# Patient Record
Sex: MALE | Race: WHITE | NOT HISPANIC OR LATINO | Employment: OTHER | URBAN - METROPOLITAN AREA
[De-identification: names, ages, dates, MRNs, and addresses within clinical notes are randomized per-mention and may not be internally consistent; named-entity substitution may affect disease eponyms.]

---

## 2020-03-10 ENCOUNTER — OFFICE VISIT (OUTPATIENT)
Dept: PRIMARY CARE CLINIC | Facility: CLINIC | Age: 65
End: 2020-03-10
Payer: COMMERCIAL

## 2020-03-10 ENCOUNTER — CLINICAL SUPPORT (OUTPATIENT)
Dept: PRIMARY CARE CLINIC | Facility: CLINIC | Age: 65
End: 2020-03-10
Payer: COMMERCIAL

## 2020-03-10 VITALS
HEIGHT: 65 IN | OXYGEN SATURATION: 99 % | DIASTOLIC BLOOD PRESSURE: 82 MMHG | BODY MASS INDEX: 26.72 KG/M2 | SYSTOLIC BLOOD PRESSURE: 124 MMHG | WEIGHT: 160.38 LBS | HEART RATE: 92 BPM | TEMPERATURE: 98 F | RESPIRATION RATE: 17 BRPM

## 2020-03-10 DIAGNOSIS — E78.49 OTHER HYPERLIPIDEMIA: ICD-10-CM

## 2020-03-10 DIAGNOSIS — Z11.59 NEED FOR HEPATITIS C SCREENING TEST: ICD-10-CM

## 2020-03-10 DIAGNOSIS — Z72.0 TOBACCO ABUSE: ICD-10-CM

## 2020-03-10 DIAGNOSIS — Z11.59 NEED FOR HEPATITIS C SCREENING TEST: Primary | ICD-10-CM

## 2020-03-10 DIAGNOSIS — Z87.442 HISTORY OF NEPHROLITHIASIS: ICD-10-CM

## 2020-03-10 LAB
ALBUMIN SERPL BCP-MCNC: 4.7 G/DL (ref 3.5–5.2)
ALP SERPL-CCNC: 96 U/L (ref 38–126)
ALT SERPL W/O P-5'-P-CCNC: 38 U/L (ref 17–63)
ANION GAP SERPL CALC-SCNC: 11 MMOL/L (ref 8–16)
AST SERPL-CCNC: 27 U/L (ref 15–41)
BASOPHILS # BLD AUTO: 0 K/UL (ref 0–0.2)
BASOPHILS NFR BLD: 0.5 % (ref 0–1.9)
BILIRUB SERPL-MCNC: 0.7 MG/DL (ref 0.3–1.2)
BILIRUB SERPL-MCNC: NORMAL MG/DL
BLOOD URINE, POC: NORMAL
BUN SERPL-MCNC: 16 MG/DL (ref 8–23)
CALCIUM SERPL-MCNC: 10.1 MG/DL (ref 8.6–10)
CHLORIDE SERPL-SCNC: 101 MMOL/L (ref 101–111)
CHOLEST SERPL-MCNC: 278 MG/DL (ref 80–200)
CHOLEST/HDLC SERPL: 5.5 {RATIO} (ref 2–5)
CO2 SERPL-SCNC: 28 MMOL/L (ref 23–29)
COLOR, POC UA: YELLOW
CREAT SERPL-MCNC: 1.1 MG/DL (ref 0.5–1.4)
DIFFERENTIAL METHOD: ABNORMAL
EOSINOPHIL # BLD AUTO: 0.1 K/UL (ref 0–0.5)
EOSINOPHIL NFR BLD: 0.8 % (ref 0–8)
ERYTHROCYTE [DISTWIDTH] IN BLOOD BY AUTOMATED COUNT: 14.7 % (ref 11.5–14.5)
EST. GFR  (AFRICAN AMERICAN): >60 ML/MIN/1.73 M^2
EST. GFR  (NON AFRICAN AMERICAN): >60 ML/MIN/1.73 M^2
GLUCOSE SERPL-MCNC: 107 MG/DL (ref 74–118)
GLUCOSE UR QL STRIP: NORMAL
HCT VFR BLD AUTO: 52.5 % (ref 40–54)
HDLC SERPL-MCNC: 51 MG/DL (ref 40–75)
HDLC SERPL: 18.3 % (ref 20–50)
HGB BLD-MCNC: 18 G/DL (ref 14–18)
KETONES UR QL STRIP: NORMAL
LDLC SERPL CALC-MCNC: 173 MG/DL
LEUKOCYTE ESTERASE URINE, POC: NORMAL
LYMPHOCYTES # BLD AUTO: 2.8 K/UL (ref 1–4.8)
LYMPHOCYTES NFR BLD: 36.1 % (ref 18–48)
MCH RBC QN AUTO: 31.3 PG (ref 27–31)
MCHC RBC AUTO-ENTMCNC: 34.3 G/DL (ref 32–36)
MCV RBC AUTO: 91 FL (ref 82–98)
MONOCYTES # BLD AUTO: 0.6 K/UL (ref 0.3–1)
MONOCYTES NFR BLD: 8.2 % (ref 4–15)
NEUTROPHILS # BLD AUTO: 4.1 K/UL (ref 1.8–7.7)
NEUTROPHILS NFR BLD: 54.4 % (ref 38–73)
NITRITE, POC UA: NORMAL
NONHDLC SERPL-MCNC: 227 MG/DL
PH, POC UA: 7
PLATELET # BLD AUTO: 274 K/UL (ref 150–350)
PMV BLD AUTO: 7.6 FL (ref 9.2–12.9)
POTASSIUM SERPL-SCNC: 4.2 MMOL/L (ref 3.5–5.1)
PROT SERPL-MCNC: 8.7 G/DL (ref 6–8.4)
PROTEIN, POC: NORMAL
RBC # BLD AUTO: 5.76 M/UL (ref 4.6–6.2)
SODIUM SERPL-SCNC: 140 MMOL/L (ref 136–145)
SPECIFIC GRAVITY, POC UA: 1
T4 FREE SERPL-MCNC: 0.8 NG/DL (ref 0.61–1.12)
TRIGL SERPL-MCNC: 268 MG/DL (ref 30–150)
TSH SERPL DL<=0.005 MIU/L-ACNC: 3.72 UIU/ML (ref 0.45–5.33)
UROBILINOGEN, POC UA: NORMAL
WBC # BLD AUTO: 7.6 K/UL (ref 3.9–12.7)

## 2020-03-10 PROCEDURE — 99999 PR PBB SHADOW E&M-NEW PATIENT-LVL III: CPT | Mod: PBBFAC,,, | Performed by: FAMILY MEDICINE

## 2020-03-10 PROCEDURE — 85025 COMPLETE CBC W/AUTO DIFF WBC: CPT

## 2020-03-10 PROCEDURE — 99396 PREV VISIT EST AGE 40-64: CPT | Mod: 25,S$GLB,, | Performed by: FAMILY MEDICINE

## 2020-03-10 PROCEDURE — 86803 HEPATITIS C AB TEST: CPT

## 2020-03-10 PROCEDURE — 80053 COMPREHEN METABOLIC PANEL: CPT

## 2020-03-10 PROCEDURE — 80061 LIPID PANEL: CPT

## 2020-03-10 PROCEDURE — 84443 ASSAY THYROID STIM HORMONE: CPT

## 2020-03-10 PROCEDURE — 99999 PR PBB SHADOW E&M-NEW PATIENT-LVL III: ICD-10-PCS | Mod: PBBFAC,,, | Performed by: FAMILY MEDICINE

## 2020-03-10 PROCEDURE — 81002 URINALYSIS NONAUTO W/O SCOPE: CPT | Mod: S$GLB,,, | Performed by: FAMILY MEDICINE

## 2020-03-10 PROCEDURE — 99396 PR PREVENTIVE VISIT,EST,40-64: ICD-10-PCS | Mod: 25,S$GLB,, | Performed by: FAMILY MEDICINE

## 2020-03-10 PROCEDURE — 36415 PR COLLECTION VENOUS BLOOD,VENIPUNCTURE: ICD-10-PCS | Mod: S$GLB,,, | Performed by: FAMILY MEDICINE

## 2020-03-10 PROCEDURE — 36415 COLL VENOUS BLD VENIPUNCTURE: CPT | Mod: S$GLB,,, | Performed by: FAMILY MEDICINE

## 2020-03-10 PROCEDURE — 81002 POCT URINE DIPSTICK WITHOUT MICROSCOPE: ICD-10-PCS | Mod: S$GLB,,, | Performed by: FAMILY MEDICINE

## 2020-03-10 PROCEDURE — 84439 ASSAY OF FREE THYROXINE: CPT

## 2020-03-10 RX ORDER — FENOFIBRATE 134 MG/1
134 CAPSULE ORAL
Qty: 90 CAPSULE | Refills: 1 | Status: SHIPPED | OUTPATIENT
Start: 2020-03-10 | End: 2020-03-13 | Stop reason: SDUPTHER

## 2020-03-10 NOTE — PROGRESS NOTES
Subjective:       Patient ID: Elijah Love is a 64 y.o. male.    Chief Complaint: Establish Care (review wellness )    HPI: 65 yo WM -- patient in for wellness visit had wellness screening at work cholesterol 225 better of 180 HDL 43 that is normal triglyceride 4-0 9 better of 150 glucose 94 normal HDL eating well, +BM ambulating    ROS:  Skin: no psoriasis, eczema, skin cancer  HEENT: No headache, ocular pain, blurred vision, diplopia, epistaxis, hoarseness change in voice, thyroid trouble  Lung: No pneumonia, asthma, Tb, wheezing, SOB, +smoking 1 pack per day  Heart: No chest pain, ankle edema, palpitations, MI, micheal murmur, hypertension, +hyperlipidemia--taking test for the past 10 years last 3 or 4 been elevated no stent bypass arrhythmia  Abdomen: No nausea, vomiting, diarrhea, constipation, ulcers, hepatitis, gallbladder disease, melena, hematochezia, hematemesis  : no UTI, renal disease, +  stones x2 in 1980s-no prostate problems  MS: no fractures, O/A, lupus, rheumatoid, gout  Neuro: No dizziness, LOC, seizures   No diabetes, no anemia, no anxiety, no depression  Single, single, 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 Need for hepatitis C screening test    2. Other hyperlipidemia    3. Tobacco abuse    4. History of nephrolithiasis        Plan:       Need for hepatitis C screening test  -     Hepatitis C Antibody; Future; Expected date:  03/10/2020    Other hyperlipidemia  -     CBC auto differential; Future; Expected date: 03/10/2020  -     Comprehensive metabolic panel; Future; Expected date: 03/10/2020  -     EKG 12-lead; Future  -     Fecal Immunochemical Test (iFOBT); Future; Expected date: 03/10/2020  -     Lipid panel; Future; Expected date: 03/10/2020  -     X-Ray Chest PA And Lateral; Future; Expected date: 03/10/2020  -     POCT urine dipstick without microscope  -     T4, free; Future; Expected date: 03/10/2020  -     TSH; Future; Expected date: 03/10/2020    Tobacco abuse    History of nephrolithiasis      hyperlipidemia--by history from test done--at work--triglyceride 409--cholesterol 225--will start on fenofibrate 134--1 p.o. Q.d.-if patient doing well will redo lab in 6 months and possibly treat the cholesterol with atorvastatin  Do routine labs now CBCs CMP lipids T4 TSH stool guaiac UA chest x-ray EKG is physical  Health maintenance hepatitis C lipid HIV tetanus shingles:  Fluid

## 2020-03-11 LAB — HCV AB SERPL QL IA: NEGATIVE

## 2020-03-13 NOTE — TELEPHONE ENCOUNTER
----- Message from Namita Navarro sent at 3/13/2020  2:58 PM CDT -----  PT was seen on Tuesday and dr ortiz said he was calling in a chol. Med to [facility] in [place] pt went and they don't have anything. Can you please check for him.thanks

## 2020-03-14 RX ORDER — FENOFIBRATE 134 MG/1
134 CAPSULE ORAL
Qty: 90 CAPSULE | Refills: 1 | Status: SHIPPED | OUTPATIENT
Start: 2020-03-14 | End: 2020-10-20

## 2020-03-23 ENCOUNTER — TELEPHONE (OUTPATIENT)
Dept: PRIMARY CARE CLINIC | Facility: CLINIC | Age: 65
End: 2020-03-23

## 2020-03-23 NOTE — TELEPHONE ENCOUNTER
----- Message from Johan Ryan sent at 3/23/2020  2:36 PM CDT -----  Contact: Pt [phone]  The patient said the pharmacy never received the request, even though, our records show they did. He is requesting a written script for fenofibrate micronized (LOFIBRA) 134 MG Cap.    Please call him when it's ready for .    Thank you

## 2020-03-26 ENCOUNTER — TELEPHONE (OUTPATIENT)
Dept: PRIMARY CARE CLINIC | Facility: CLINIC | Age: 65
End: 2020-03-26

## 2020-03-26 NOTE — TELEPHONE ENCOUNTER
----- Message from Josy Pete sent at 3/26/2020  1:29 PM CDT -----  Contact: Patient  Type: Needs Medical Advice    Who Called:  Elijah patient  Symptoms (please be specific):  N/A  How long has patient had these symptoms:  N/A  Pharmacy name and phone #:    [id number] [place] - [address] AT [facility] [address]  [address] [address]  Phone: [phone] Fax: [phone]  Best Call Back Number: [phone]  Additional Information: Calling because Rx fenofibrate micronized (LOFIBRA) 134 MG Cap needs prior authorization. Please advise. Thanks.

## 2020-03-28 RX ORDER — GEMFIBROZIL 600 MG/1
600 TABLET, FILM COATED ORAL
Qty: 180 TABLET | Refills: 1 | Status: SHIPPED | OUTPATIENT
Start: 2020-03-28 | End: 2020-10-20

## 2020-10-05 ENCOUNTER — PATIENT MESSAGE (OUTPATIENT)
Dept: ADMINISTRATIVE | Facility: HOSPITAL | Age: 65
End: 2020-10-05

## 2020-10-20 ENCOUNTER — OFFICE VISIT (OUTPATIENT)
Dept: PRIMARY CARE CLINIC | Facility: CLINIC | Age: 65
End: 2020-10-20
Payer: COMMERCIAL

## 2020-10-20 VITALS
BODY MASS INDEX: 27.61 KG/M2 | DIASTOLIC BLOOD PRESSURE: 82 MMHG | OXYGEN SATURATION: 98 % | RESPIRATION RATE: 18 BRPM | HEART RATE: 101 BPM | WEIGHT: 165.69 LBS | HEIGHT: 65 IN | TEMPERATURE: 98 F | SYSTOLIC BLOOD PRESSURE: 134 MMHG

## 2020-10-20 DIAGNOSIS — E78.49 OTHER HYPERLIPIDEMIA: Primary | ICD-10-CM

## 2020-10-20 PROCEDURE — 3008F BODY MASS INDEX DOCD: CPT | Mod: CPTII,S$GLB,, | Performed by: FAMILY MEDICINE

## 2020-10-20 PROCEDURE — 99213 OFFICE O/P EST LOW 20 MIN: CPT | Mod: 25,S$GLB,, | Performed by: FAMILY MEDICINE

## 2020-10-20 PROCEDURE — 90714 TD VACCINE GREATER THAN OR EQUAL TO 7YO PRESERVATIVE FREE IM: ICD-10-PCS | Mod: S$GLB,,, | Performed by: FAMILY MEDICINE

## 2020-10-20 PROCEDURE — 99999 PR PBB SHADOW E&M-EST. PATIENT-LVL III: CPT | Mod: PBBFAC,,, | Performed by: FAMILY MEDICINE

## 2020-10-20 PROCEDURE — 3008F PR BODY MASS INDEX (BMI) DOCUMENTED: ICD-10-PCS | Mod: CPTII,S$GLB,, | Performed by: FAMILY MEDICINE

## 2020-10-20 PROCEDURE — 90471 IMMUNIZATION ADMIN: CPT | Mod: S$GLB,,, | Performed by: FAMILY MEDICINE

## 2020-10-20 PROCEDURE — 90471 TD VACCINE GREATER THAN OR EQUAL TO 7YO PRESERVATIVE FREE IM: ICD-10-PCS | Mod: S$GLB,,, | Performed by: FAMILY MEDICINE

## 2020-10-20 PROCEDURE — 90714 TD VACC NO PRESV 7 YRS+ IM: CPT | Mod: S$GLB,,, | Performed by: FAMILY MEDICINE

## 2020-10-20 PROCEDURE — 99213 PR OFFICE/OUTPT VISIT, EST, LEVL III, 20-29 MIN: ICD-10-PCS | Mod: 25,S$GLB,, | Performed by: FAMILY MEDICINE

## 2020-10-20 PROCEDURE — 99999 PR PBB SHADOW E&M-EST. PATIENT-LVL III: ICD-10-PCS | Mod: PBBFAC,,, | Performed by: FAMILY MEDICINE

## 2020-10-20 RX ORDER — FENOFIBRATE 145 MG/1
145 TABLET, FILM COATED ORAL DAILY
Qty: 30 TABLET | Refills: 5 | Status: SHIPPED | OUTPATIENT
Start: 2020-10-20 | End: 2022-09-28

## 2020-10-20 RX ORDER — FENOFIBRATE 145 MG/1
145 TABLET, FILM COATED ORAL DAILY
Qty: 30 TABLET | Refills: 5 | Status: SHIPPED | OUTPATIENT
Start: 2020-10-20 | End: 2020-10-20

## 2020-10-20 RX ORDER — FENOFIBRATE 145 MG/1
145 TABLET, FILM COATED ORAL DAILY
Qty: 90 TABLET | Refills: 3 | Status: SHIPPED | OUTPATIENT
Start: 2020-10-20 | End: 2020-10-20

## 2020-10-20 NOTE — PROGRESS NOTES
Subjective:       Patient ID: Elijah Love is a 65 y.o. male.    Chief Complaint: Medication Problem    HPI: 65 yo WM -- history of hyperlipidemia--patient unable to swallow Lopid 600 mg---patient states wants to fine to smaller pill can take fin fibrate 134.  Patient is on low-fat diet--exercise---no weight loss     Patient agrees to get tetanus shot will get pneumococcal vaccine later--arm just stops hurting from the flu shot last    ROS:  Skin: no psoriasis, eczema, skin cancer  HEENT: No headache, ocular pain, blurred vision, diplopia, epistaxis, hoarseness change in voice, thyroid trouble  Lung: No pneumonia, asthma, Tb, wheezing, SOB, +smoking 1 pack per day  Heart: No chest pain, ankle edema, palpitations, MI, micheal murmur, hypertension, +hyperlipidemia--taking test for the past 10 years last 3 or 4 been elevated no stent bypass arrhythmia  Abdomen: No nausea, vomiting, diarrhea, constipation, ulcers, hepatitis, gallbladder disease, melena, hematochezia, hematemesis  : no UTI, renal disease, +  stones x2 in 1980s-no prostate problems  MS: no fractures, O/A, lupus, rheumatoid, gout  Neuro: No dizziness, LOC, seizures   No diabetes, no anemia, no anxiety, no depression  Single, single, 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 Other hyperlipidemia        Plan:       Other hyperlipidemia  -     CBC auto differential; Future; Expected date:  04/20/2021  -     Comprehensive Metabolic Panel; Future; Expected date: 04/20/2021  -     Lipid Panel; Future; Expected date: 04/20/2021    Other orders  -     (In Office Administered) Td Vaccine - Preservative Free  -     Discontinue: fenofibrate (TRICOR) 145 MG tablet; Take 1 tablet (145 mg total) by mouth once daily.  Dispense: 90 tablet; Refill: 3  -     Discontinue: fenofibrate (TRICOR) 145 MG tablet; Take 1 tablet (145 mg total) by mouth once daily.  Dispense: 30 tablet; Refill: 5  -     fenofibrate (TRICOR) 145 MG tablet; Take 1 tablet (145 mg total) by mouth once daily.  Dispense: 30 tablet; Refill: 5        Hyperlipidemia---patient was unable to swallow Lopid states to big fenofibrate--134 is not covered fenofibrate 145--[facility] $9 [facility]   Patient agrees to get tetanus shot now---will get pneumococcal vaccine and 1 week--did not want to get both because had a sore arm from his flu shot  Do lab q.6 months CBCs CMP lipid

## 2020-10-20 NOTE — PROGRESS NOTES
Verified pt ID using name and . NKDA. Administered TD in left deltoid per physician order using aseptic technique. Aspirated and no blood return noted. Pt tolerated well with no adverse reactions noted.

## 2021-02-01 ENCOUNTER — TELEPHONE (OUTPATIENT)
Dept: SURGERY | Facility: CLINIC | Age: 66
End: 2021-02-01

## 2021-02-01 ENCOUNTER — OFFICE VISIT (OUTPATIENT)
Dept: PRIMARY CARE CLINIC | Facility: CLINIC | Age: 66
End: 2021-02-01
Payer: MEDICAID

## 2021-02-01 VITALS
RESPIRATION RATE: 17 BRPM | BODY MASS INDEX: 27.7 KG/M2 | DIASTOLIC BLOOD PRESSURE: 72 MMHG | WEIGHT: 166.25 LBS | OXYGEN SATURATION: 99 % | HEIGHT: 65 IN | SYSTOLIC BLOOD PRESSURE: 118 MMHG | HEART RATE: 89 BPM

## 2021-02-01 DIAGNOSIS — E78.49 OTHER HYPERLIPIDEMIA: Primary | ICD-10-CM

## 2021-02-01 DIAGNOSIS — Z72.0 TOBACCO ABUSE: ICD-10-CM

## 2021-02-01 DIAGNOSIS — Z12.11 SCREENING FOR COLON CANCER: Primary | ICD-10-CM

## 2021-02-01 DIAGNOSIS — Z87.442 HISTORY OF NEPHROLITHIASIS: ICD-10-CM

## 2021-02-01 PROCEDURE — 99213 OFFICE O/P EST LOW 20 MIN: CPT | Mod: PBBFAC,PN,25 | Performed by: FAMILY MEDICINE

## 2021-02-01 PROCEDURE — 99999 PR PBB SHADOW E&M-EST. PATIENT-LVL III: ICD-10-PCS | Mod: PBBFAC,,, | Performed by: FAMILY MEDICINE

## 2021-02-01 PROCEDURE — 99999 PR PBB SHADOW E&M-EST. PATIENT-LVL III: CPT | Mod: PBBFAC,,, | Performed by: FAMILY MEDICINE

## 2021-02-01 PROCEDURE — 99396 PREV VISIT EST AGE 40-64: CPT | Mod: S$PBB,,, | Performed by: FAMILY MEDICINE

## 2021-02-01 PROCEDURE — 90471 IMMUNIZATION ADMIN: CPT | Mod: PBBFAC,PN

## 2021-02-01 PROCEDURE — 99396 PR PREVENTIVE VISIT,EST,40-64: ICD-10-PCS | Mod: S$PBB,,, | Performed by: FAMILY MEDICINE

## 2021-02-01 RX ORDER — SODIUM CHLORIDE 0.9 % (FLUSH) 0.9 %
3 SYRINGE (ML) INJECTION
Status: CANCELLED | OUTPATIENT
Start: 2021-02-01

## 2021-02-01 RX ORDER — SODIUM, POTASSIUM,MAG SULFATES 17.5-3.13G
1 SOLUTION, RECONSTITUTED, ORAL ORAL DAILY
Qty: 1 KIT | Refills: 0 | Status: SHIPPED | OUTPATIENT
Start: 2021-02-01 | End: 2021-02-03

## 2021-02-03 DIAGNOSIS — E78.49 OTHER HYPERLIPIDEMIA: Primary | ICD-10-CM

## 2021-02-03 RX ORDER — ATORVASTATIN CALCIUM 10 MG/1
10 TABLET, FILM COATED ORAL DAILY
Qty: 90 TABLET | Refills: 3 | Status: SHIPPED | OUTPATIENT
Start: 2021-02-03 | End: 2022-09-28

## 2021-04-05 ENCOUNTER — PATIENT MESSAGE (OUTPATIENT)
Dept: ADMINISTRATIVE | Facility: HOSPITAL | Age: 66
End: 2021-04-05

## 2022-08-24 ENCOUNTER — OFFICE VISIT (OUTPATIENT)
Dept: PRIMARY CARE CLINIC | Facility: CLINIC | Age: 67
End: 2022-08-24
Payer: MEDICARE

## 2022-08-24 VITALS
HEIGHT: 65 IN | DIASTOLIC BLOOD PRESSURE: 68 MMHG | HEART RATE: 105 BPM | OXYGEN SATURATION: 95 % | BODY MASS INDEX: 25.99 KG/M2 | WEIGHT: 156 LBS | SYSTOLIC BLOOD PRESSURE: 122 MMHG | RESPIRATION RATE: 18 BRPM

## 2022-08-24 DIAGNOSIS — Z87.442 HISTORY OF NEPHROLITHIASIS: ICD-10-CM

## 2022-08-24 DIAGNOSIS — E78.49 OTHER HYPERLIPIDEMIA: ICD-10-CM

## 2022-08-24 DIAGNOSIS — M79.662 PAIN IN LEFT LOWER LEG: Primary | ICD-10-CM

## 2022-08-24 DIAGNOSIS — R20.0 NUMBNESS OF LEFT FOOT: ICD-10-CM

## 2022-08-24 DIAGNOSIS — Z72.0 TOBACCO ABUSE: ICD-10-CM

## 2022-08-24 PROCEDURE — 99999 PR PBB SHADOW E&M-EST. PATIENT-LVL III: ICD-10-PCS | Mod: PBBFAC,,, | Performed by: FAMILY MEDICINE

## 2022-08-24 PROCEDURE — 99213 OFFICE O/P EST LOW 20 MIN: CPT | Mod: PBBFAC,PN | Performed by: FAMILY MEDICINE

## 2022-08-24 PROCEDURE — 99214 OFFICE O/P EST MOD 30 MIN: CPT | Mod: S$PBB,,, | Performed by: FAMILY MEDICINE

## 2022-08-24 PROCEDURE — 99999 PR PBB SHADOW E&M-EST. PATIENT-LVL III: CPT | Mod: PBBFAC,,, | Performed by: FAMILY MEDICINE

## 2022-08-24 PROCEDURE — 99214 PR OFFICE/OUTPT VISIT, EST, LEVL IV, 30-39 MIN: ICD-10-PCS | Mod: S$PBB,,, | Performed by: FAMILY MEDICINE

## 2022-08-24 RX ORDER — GABAPENTIN 250 MG/5ML
SOLUTION ORAL
Qty: 470 ML | Refills: 5 | Status: SHIPPED | OUTPATIENT
Start: 2022-08-24 | End: 2023-01-30

## 2022-08-24 RX ORDER — PREDNISOLONE 15 MG/5ML
SOLUTION ORAL
Qty: 50 ML | Refills: 0 | Status: SHIPPED | OUTPATIENT
Start: 2022-08-24 | End: 2022-09-28 | Stop reason: ALTCHOICE

## 2022-08-24 NOTE — PATIENT INSTRUCTIONS
Prednisolone 15 mg per 5 cc--1 tsp by mouth once daily for 10 days for swelling and pain in the left lower leg  Gabapentin 250 mg per 5 cc 1/2 tsp by mouth 3 times daily for pain in numbness left leg if necessary can increase to 1 tsp by mouth 3 times daily  Wants off prednisolone can take ibuprofen liquid or get Voltaren gel  Needs to do lab yearly  Needs to see the dentist  Appointment see me in 3 months   Due to inability to swallow pills or capsules could consider Repatha for cholesterol

## 2022-08-24 NOTE — PROGRESS NOTES
Subjective:       Patient ID: Elijah Love is a 66 y.o. male.    Chief Complaint: Follow-up (Pt states in for check-up, but also wants to be evaluated for left leg pain and left foot numbness both off and on for 1 week. )    HPI: 67 yo WM --checkup--left leg pain and foot numbness x1 week--unable to take Lipitor or Tricor can not swallow pillsarts   Complaining of left leg pain in foot numbness a x1 week--feels like a bad charley horse--pain in the calf inch in from the lower knee to the ankle and had some numbness of the left foot on the medial aspect, happened Friday or Saturday--lasted about 20 minutes.  Like when patient would fall asleep on his arm.  Had good muscle strength no loss of ability to ambulate only that it hurt  Patient works on computer all day feel some discomfort in the leg---walking has some discomfort Think\s pulled something took tylenol crushed and put in coke .   Unable swallow any pills or or capsules .   Starts new job tomorrow .       ROS:  Skin: no psoriasis, eczema, skin cancer  HEENT: No headache, ocular pain, blurred vision, diplopia, epistaxis, hoarseness change in voice, thyroid trouble  Lung: No pneumonia, asthma, Tb, wheezing, SOB, +smoking 1 pack per day  Heart: No chest pain, ankle edema, palpitations, MI, micheal murmur, hypertension, +hyperlipidemia--difficulty swallowing pills no stent bypass arrhythmia  Abdomen: No nausea, vomiting, diarrhea, constipation, ulcers, hepatitis, gallbladder disease, melena, hematochezia, hematemesis  : no UTI, renal disease, +  stones x2 in 1980s-no prostate problems  MS: no fractures, O/A, lupus, rheumatoid, gout  Neuro: No dizziness, LOC, seizures   No diabetes, no anemia, no anxiety, no depression  Single, no children, work laid off , lives alone     Objective:   Physical Exam:  General: Well nourished, well developed, no acute distress  Skin: No lesions  HEENT: Eyes PERRLA, EOM intact, nose patent, throat non-erythematous   NECK:  Supple, no bruits, No JVD, no nodes  Lungs: Clear, no rales, rhonchi, wheezing  Heart: Regular rate and rhythm, no murmurs, gallops, or rubs  Abdomen: flat, bowel sounds positive, no tenderness, or organomegaly  Genitalia circumcised--no hernia--testes descended  Rectal--slight tightness to the rectal area--prostate normal no hemorrhoids no fissure  MS:  Tenderness on palpation anterior left shin and posterior calf--good flexion extension able squat arise without difficulty  Neuro: Alert, CN intact, oriented X 3  Extremities: No cyanosis, clubbing, or edema         Assessment:       1. Pain in left lower leg    2. Numbness of left foot    3. Tobacco abuse    4. History of nephrolithiasis    5. Other hyperlipidemia        Plan:       Pain in left lower leg    Numbness of left foot    Tobacco abuse    History of nephrolithiasis    Other hyperlipidemia    Other orders  -     prednisoLONE (PRELONE) 15 mg/5 mL syrup; 1 tsp p.o. q.d. times 10 days  Dispense: 50 mL; Refill: 0  -     gabapentin (NEURONTIN) 250 mg/5 mL solution; 1/2 tsp po tid for pain or numbness if needed can increase to 1 tsp po qd prn numbness  Dispense: 470 mL; Refill: 5      Left leg pain--need a ankle--some numbness of the medial foot--prednisolone 50 mg per 5 cc 1 tsp q.d. times 10 days--can take with Tylenol--or gabapentin 250 mg per 5 cc half a tsp p.o. t.i.d.--0 1 tsp p.o. t.i.d. for pain and numbness in the foot--once off of prednisolone could take ibuprofen liquid  Hyperlipidemia unable swallow atorvastatin or fenfibrate ---will discuss Repatha   Tobacco abuse--DC smoking  Dental caries especially upper jaws bilaterally needs to see dentist  History nephrolithiasis 20 years ago no problems now  Physical exam CBCs CMP lipids T4 TSH stool guaiac UA PSA chest x-ray EKG

## 2022-09-02 ENCOUNTER — HOSPITAL ENCOUNTER (OUTPATIENT)
Facility: HOSPITAL | Age: 67
Discharge: HOME | End: 2022-09-03
Attending: EMERGENCY MEDICINE | Admitting: EMERGENCY MEDICINE
Payer: MEDICARE

## 2022-09-02 ENCOUNTER — NURSE TRIAGE (OUTPATIENT)
Dept: ADMINISTRATIVE | Facility: CLINIC | Age: 67
End: 2022-09-02
Payer: MEDICARE

## 2022-09-02 DIAGNOSIS — I99.8 LOWER LIMB ISCHEMIA: ICD-10-CM

## 2022-09-02 DIAGNOSIS — I70.212 ATHEROSCLEROSIS OF NATIVE ARTERIES OF EXTREMITIES WITH INTERMITTENT CLAUDICATION, LEFT LEG: Primary | ICD-10-CM

## 2022-09-02 LAB
APTT BLDCRRT: 50.8 SEC (ref 21–32)
BILIRUB UR QL STRIP: NEGATIVE
CLARITY UR REFRACT.AUTO: CLEAR
COLOR UR AUTO: COLORLESS
GLUCOSE UR QL STRIP: NEGATIVE
HGB UR QL STRIP: NEGATIVE
INR PPP: 1.1 (ref 0.8–1.2)
KETONES UR QL STRIP: NEGATIVE
LEUKOCYTE ESTERASE UR QL STRIP: NEGATIVE
NITRITE UR QL STRIP: NEGATIVE
PH UR STRIP: 6 [PH] (ref 5–8)
PROT UR QL STRIP: NEGATIVE
PROTHROMBIN TIME: 11.1 SEC (ref 9–12.5)
SP GR UR STRIP: 1.01 (ref 1–1.03)
URN SPEC COLLECT METH UR: ABNORMAL

## 2022-09-02 PROCEDURE — 63600175 PHARM REV CODE 636 W HCPCS: Performed by: EMERGENCY MEDICINE

## 2022-09-02 PROCEDURE — 96361 HYDRATE IV INFUSION ADD-ON: CPT

## 2022-09-02 PROCEDURE — 99223 1ST HOSP IP/OBS HIGH 75: CPT | Mod: AI,GC,, | Performed by: SURGERY

## 2022-09-02 PROCEDURE — 99285 EMERGENCY DEPT VISIT HI MDM: CPT | Mod: ,,, | Performed by: EMERGENCY MEDICINE

## 2022-09-02 PROCEDURE — 81003 URINALYSIS AUTO W/O SCOPE: CPT | Performed by: EMERGENCY MEDICINE

## 2022-09-02 PROCEDURE — 25000003 PHARM REV CODE 250

## 2022-09-02 PROCEDURE — 99223 PR INITIAL HOSPITAL CARE,LEVL III: ICD-10-PCS | Mod: AI,GC,, | Performed by: SURGERY

## 2022-09-02 PROCEDURE — 85730 THROMBOPLASTIN TIME PARTIAL: CPT | Mod: 91 | Performed by: EMERGENCY MEDICINE

## 2022-09-02 PROCEDURE — 99285 PR EMERGENCY DEPT VISIT,LEVEL V: ICD-10-PCS | Mod: ,,, | Performed by: EMERGENCY MEDICINE

## 2022-09-02 PROCEDURE — 85610 PROTHROMBIN TIME: CPT | Mod: 91 | Performed by: EMERGENCY MEDICINE

## 2022-09-02 PROCEDURE — G0378 HOSPITAL OBSERVATION PER HR: HCPCS

## 2022-09-02 PROCEDURE — 99285 EMERGENCY DEPT VISIT HI MDM: CPT | Mod: 25

## 2022-09-02 RX ORDER — SODIUM CHLORIDE 9 MG/ML
INJECTION, SOLUTION INTRAVENOUS CONTINUOUS
Status: DISCONTINUED | OUTPATIENT
Start: 2022-09-02 | End: 2022-09-03

## 2022-09-02 RX ORDER — HEPARIN SODIUM,PORCINE/D5W 25000/250
16 INTRAVENOUS SOLUTION INTRAVENOUS CONTINUOUS
Status: DISCONTINUED | OUTPATIENT
Start: 2022-09-02 | End: 2022-09-03

## 2022-09-02 RX ADMIN — SODIUM CHLORIDE: 0.9 INJECTION, SOLUTION INTRAVENOUS at 04:09

## 2022-09-02 NOTE — TELEPHONE ENCOUNTER
Last wed saw Dr Tilley he thik he pulled a muscle. He has been taking gabapentin and it is not helping. Left Foot was hurting so bad he couldn't walk and he couldn't move his toes. Pain is in shin half way down from the shin to the foot pain is 8/10.  When he stands on it hurts to walk. Left foot is cold. Pt is able to move toes now this am he couldn't. Care advice recommend pt go to Er. Pt verbalized understanding.   Reason for Disposition   Entire foot is cool or blue in comparison to other foot    Protocols used: Foot Pain-A-OH

## 2022-09-02 NOTE — ED NOTES
Pt to ED for pain in left leg at [facility] and then transferred to [facility].  Pt found to have decreased pulses in left foot, right foot warmer than left.  Pt arrived on hep gtt, and NS @ 100 mL/ hr.  AAOX4, breathing even and labored on room air.  Moves all four extremities with full rom.  Pending blood work and floor bed.

## 2022-09-02 NOTE — HPI
Elijah Love,  66-year-old male with a history of hyperlipidemia, smoking history of 40+ years who arrived to the ED with complaints of left lower limb pain on walking that has been present for the last 2 weeks but has worsened today.  Patient describes pain from distal calf down to foot, no numbness or tingling no loss of sensation or motor function. Denies pain at rest, mainly occurs when ambulating. He had had previous episodes of pain occasionally when ambulating but none as worse as today. He came to the ED for evaluation and is now on heparin.   Since he began receiving his heparin infusion patient refers feeling better.

## 2022-09-02 NOTE — ED PROVIDER NOTES
Encounter Date: 9/2/2022       History     Chief Complaint   Patient presents with    Circulatory Problem     [facility] to [facility] pain, occlusion found, no palpable pulse on arrival there. Started on Heparin, pain has subsided and has + pulse on arrival to this ED. Here for vascular surgery consult.       66-year-old male with history of hyperlipidemia presents with 2 weeks of worsening left foot pain.  Symptoms come and go.  They are worse when he walks a long distance.  He has no known peripheral vascular disease.  Today he went to an outside hospital with found diminished pulses to the left foot.  Sent here for vascular surgery evaluation on heparin drip.  Ultrasound confirms PVD.  Patient denies nausea, vomiting, diarrhea, fever, cough, shortness of breath, chest pain, abdominal pain, or dysuria.  A ten point review of systems was completed and is negative except as documented above.  Patient denies any other acute medical complaint.  The patients available PMH, PSH, Social History, medications, allergies, and triage vital signs were reviewed just prior to their medical evaluation.       Review of patient's allergies indicates:  No Known Allergies  Past Medical History:   Diagnosis Date    Kidney stones      No past surgical history on file.  History reviewed. No pertinent family history.  Social History     Tobacco Use    Smoking status: Every Day    Smokeless tobacco: Never   Substance Use Topics    Alcohol use: Yes    Drug use: Never     Review of Systems   Constitutional:  Negative for fever.   HENT:  Negative for sore throat.    Eyes:  Negative for visual disturbance.   Respiratory:  Negative for cough and shortness of breath.    Cardiovascular:  Negative for chest pain.   Gastrointestinal:  Negative for abdominal pain, diarrhea, nausea and vomiting.   Genitourinary:  Negative for dysuria.   Musculoskeletal:  Positive for myalgias. Negative for neck pain.   Skin:  Positive for color change.  Negative for rash and wound.   Allergic/Immunologic: Negative for immunocompromised state.   Neurological:  Negative for syncope.   Psychiatric/Behavioral:  Negative for confusion.      Physical Exam     Initial Vitals [09/02/22 1415]   BP Pulse Resp Temp SpO2   (!) 140/98 104 18 98.7 °F (37.1 °C) 98 %      MAP       --         Physical Exam    Nursing note and vitals reviewed.  Constitutional: He appears well-developed and well-nourished. He is not diaphoretic. No distress.   HENT:   Head: Normocephalic and atraumatic.   Nose: Nose normal.   Eyes: Conjunctivae are normal. Right eye exhibits no discharge. Left eye exhibits no discharge.   Neck: Neck supple.   Normal range of motion.  Cardiovascular:  Normal rate, regular rhythm and normal heart sounds.     Exam reveals no gallop and no friction rub.       No murmur heard.  Pulmonary/Chest: Breath sounds normal. No respiratory distress. He has no wheezes. He has no rhonchi. He has no rales.   Abdominal: Abdomen is soft. He exhibits no distension. There is no abdominal tenderness. There is no rebound and no guarding.   Musculoskeletal:         General: No tenderness or edema. Normal range of motion.      Cervical back: Normal range of motion and neck supple.     Neurological: He is alert and oriented to person, place, and time. GCS score is 15. GCS eye subscore is 4. GCS verbal subscore is 5. GCS motor subscore is 6.   Skin: Skin is warm and dry. Capillary refill takes more than 3 seconds. No rash noted. No erythema.   Left foot is cool, but not cold compared to right, no doppler or palpable pulses in left foot, delayed cap refill compared to right, no color change   Psychiatric: He has a normal mood and affect. His behavior is normal. Judgment and thought content normal.       ED Course   Procedures  Labs Reviewed   HIV 1 / 2 ANTIBODY   HEPATITIS C ANTIBODY   URINALYSIS, REFLEX TO URINE CULTURE          Imaging Results    None          Medications   0.9%  NaCl  infusion ( Intravenous New Bag 9/2/22 1814)     Medical Decision Making:   History:   Old Medical Records: I decided to obtain old medical records.  Old Records Summarized: records from another hospital.       <> Summary of Records: OSH labs  Clinical Tests:   Lab Tests: Ordered and Reviewed  Radiological Study: Reviewed  ED Management:  66-year-old male presents with peripheral artery disease of the left leg.  Vitals unremarkable.  Physical exam as above.  Discussed with vascular surgery who evaluated at bedside and will admit.  Ordered urine as patient is complaining of frequent urination.  Did bedside teaching.  All questions answered.  Patient acknowledges understanding.   Other:   I have discussed this case with another health care provider.       <> Summary of the Discussion: Vascular surgery, ed eval                    Clinical Impression:   Final diagnoses:  [I99.8] Lower limb ischemia        ED Disposition Condition    Observation                 Sathish Meehan MD  09/02/22 4852

## 2022-09-02 NOTE — ASSESSMENT & PLAN NOTE
Patient is a 66 M with hx of hyperlipidemia, smoking hx of 40+ yrs, who is demonstrating symptoms of acute on chronic lower limb ischemia in his left leg. His imaging showed triphasic wave forms CFA, SFA, mid SFA, with distal SFA occlusion with POP and tibial monophasic signals.   Currently, patient ischemia does not need emergent intervention. At the moment, we will manage medically and will assess need for surgery after additional workup      Plan  -- Admit to observation  -- 16 units/Kg Heparin Drip  -- IVF @ 100ml/hr  -- CTA BLE, with run off  -- Regular diet

## 2022-09-02 NOTE — ED NOTES
Per Eileen pharmacist, keep [place] hep gtt running at this time.  Draw aptt, and then adjust rate accordingly.

## 2022-09-02 NOTE — ED TRIAGE NOTES
Pt states he had a sharp pain in his left calf. Pt went to [facility]. They found a clot in his left leg. Pt upon arrival to [facility] did not have a pulse according to facility. Pt currently has a pulse upon arrival to this ED. Pt states pt is relieved. Pain 0/10

## 2022-09-02 NOTE — CONSULTS
[facility]  Vascular Surgery  Consult Note    Inpatient consult to Vascular Surgery  Consult performed by: Neida Mello MD  Consult ordered by: Sathish Meehan MD      Subjective:     Chief Complaint/Reason for Admission: Lower limb pain, Acute on Chronic Limb ischemia    History of Present Illness: Elijah Love,  66-year-old male with a history of hyperlipidemia, smoking history of 40+ years who arrived to the ED with complaints of left lower limb pain. Previously he only had pain with walking but this worsened to pain at rest today.  Patient describes pain from distal calf down to foot, no numbness or tingling or weakness. Today he began to have pain at rest in the foot and this was different from his previous pain. Prior to this he had never had pain at rest and had only had pain when ambulating which would resolve with rest. A year or two ago he could walk several blocks before the pain began but not the pain now occurs within one block. Pain typically resolves with rest but did not resolve today so he came to the ER. He came to the ED for evaluation and is now on heparin.   Since he began receiving his heparin infusion and IV fluids patient states that his pain has resolved feeling better.         Medications:  Continuous Infusions:   sodium chloride 0.9% 100 mL/hr at 09/02/22 1615     Scheduled Meds:  PRN Meds:     Objective:     Vital Signs (Most Recent):  Temp: 98.7 °F (37.1 °C) (09/02/22 1415)  Pulse: 100 (09/02/22 1617)  Resp: (!) 28 (09/02/22 1617)  BP: (!) 141/86 (09/02/22 1617)  SpO2: 96 % (09/02/22 1617)   Vital Signs (24h Range):  Temp:  [97 °F (36.1 °C)-98.7 °F (37.1 °C)] 98.7 °F (37.1 °C)  Pulse:  [] 100  Resp:  [18-28] 28  SpO2:  [95 %-98 %] 96 %  BP: (126-164)/(76-98) 141/86       Review of Systems   Constitutional: Negative for weight loss.   HENT:  Negative for ear pain and nosebleeds.    Eyes:  Negative for discharge and pain.   Cardiovascular:  Negative  for chest pain and palpitations.   Respiratory:  Negative for cough, shortness of breath and wheezing.    Endocrine: Negative for cold intolerance, heat intolerance and polyphagia.   Hematologic/Lymphatic: Negative for adenopathy. Does not bruise/bleed easily.   Skin:  Negative for itching and rash.   Musculoskeletal:  Positive for muscle cramps. Negative for joint swelling.   Gastrointestinal:  Negative for abdominal pain, diarrhea, nausea and vomiting.   Genitourinary:  Negative for dysuria and flank pain.   Neurological:  Negative for numbness and seizures.         Physical Exam  Constitutional:       Appearance: Normal appearance.   HENT:      Head: Normocephalic and atraumatic.      Nose: Nose normal.      Mouth/Throat:      Mouth: Mucous membranes are dry.   Eyes:      Pupils: Pupils are equal, round, and reactive to light.   Cardiovascular:      Rate and Rhythm: Normal rate and regular rhythm.      Pulses:           Femoral pulses are 2+ on the right side and 2+ on the left side.       Dorsalis pedis pulses are 2+ on the right side. Left dorsalis pedis pulse not palpable.        Posterior tibial pulses are 2+ on the right side. Left posterior tibial pulse not palpable.   Pulmonary:      Effort: Pulmonary effort is normal.      Breath sounds: Normal breath sounds.   Abdominal:      Palpations: Abdomen is soft.   Musculoskeletal:      Cervical back: Normal range of motion and neck supple.      Comments: LLE cool, no loss of sensation, motor 5/5 on dorsiflexion and plantarflexion   Skin:     General: Skin is warm.   Neurological:      Mental Status: He is alert.       Significant Labs:  CBC:   Recent Labs   Lab 09/02/22  1000   WBC 11.43   RBC 5.49   HGB 17.0   HCT 49.2      MCV 90   MCH 31.0   MCHC 34.6     CMP:   Recent Labs   Lab 09/02/22  1000      CALCIUM 9.3   ALBUMIN 3.8   PROT 8.0      K 4.2   CO2 22*      BUN 25*   CREATININE 1.0   ALKPHOS 88   ALT 16   AST 13   BILITOT 0.2        IMAGING:  LLE Duplex ultrasound: triphasic CFA, DFA, and proximal SFA waveforms with monophasic popliteal waveforms and biphasic AT waveforms. Findings suggest SFA/AK pop occlusion.    Assessment/Plan:     Lower limb ischemia  Patient is a 66 M with hx of hyperlipidemia, smoking hx of 40+ yrs, who is demonstrating symptoms of acute on chronic lower limb ischemia in his left leg. This change in symptoms likely occurred due to dehydration. His rest pain has resolved with IV fluids and heparin. Physical exam and duplex ultrasound suggests infra-inguinal disease in the SFA/popliteal artery.  Currently patient has no pain at rest so no indication for urgent intervention. Will manage medically and will assess need for surgery after additional workup      Plan  -- Admit to observation  -- 16 units/Kg Heparin Drip  -- IVF @ 100ml/hr  -- Regular diet        Thank you for your consult. I will follow-up with patient. Please contact us if you have any additional questions.    Neida Mello MD  Vascular Surgery  [facility]    VASCULAR SURGERY ATTENDING ATTESTATION    I have seen and examined the patient and I have taken the history and reviewed the chart/studies and personally reviewed and interpreted the left leg arterial duplex ultrasound. I have made the appropriate edits/additions to the above note and agree with the above assessment and plan.       ALEXIS Granado II, MD, [facility]  Vascular Surgery  [facility]

## 2022-09-02 NOTE — SUBJECTIVE & OBJECTIVE
Medications:  Continuous Infusions:   sodium chloride 0.9% 100 mL/hr at 09/02/22 1615     Scheduled Meds:  PRN Meds:     Objective:     Vital Signs (Most Recent):  Temp: 98.7 °F (37.1 °C) (09/02/22 1415)  Pulse: 100 (09/02/22 1617)  Resp: (!) 28 (09/02/22 1617)  BP: (!) 141/86 (09/02/22 1617)  SpO2: 96 % (09/02/22 1617)   Vital Signs (24h Range):  Temp:  [97 °F (36.1 °C)-98.7 °F (37.1 °C)] 98.7 °F (37.1 °C)  Pulse:  [] 100  Resp:  [18-28] 28  SpO2:  [95 %-98 %] 96 %  BP: (126-164)/(76-98) 141/86         Physical Exam  Constitutional:       Appearance: Normal appearance.   HENT:      Head: Normocephalic and atraumatic.      Nose: Nose normal.      Mouth/Throat:      Mouth: Mucous membranes are dry.   Eyes:      Pupils: Pupils are equal, round, and reactive to light.   Cardiovascular:      Rate and Rhythm: Normal rate and regular rhythm.      Pulses:           Femoral pulses are 2+ on the right side and 2+ on the left side.       Dorsalis pedis pulses are 2+ on the right side. Left dorsalis pedis pulse not accessible.        Posterior tibial pulses are 2+ on the right side. Left posterior tibial pulse not accessible.   Pulmonary:      Effort: Pulmonary effort is normal.      Breath sounds: Normal breath sounds.   Abdominal:      Palpations: Abdomen is soft.   Musculoskeletal:      Cervical back: Normal range of motion and neck supple.      Comments: LLE cool, no loss of sensation, motor 5/5 on dorsiflexion and plantarflexion   Skin:     General: Skin is warm.   Neurological:      Mental Status: He is alert.       Significant Labs:  CBC:   Recent Labs   Lab 09/02/22  1000   WBC 11.43   RBC 5.49   HGB 17.0   HCT 49.2      MCV 90   MCH 31.0   MCHC 34.6     CMP:   Recent Labs   Lab 09/02/22  1000      CALCIUM 9.3   ALBUMIN 3.8   PROT 8.0      K 4.2   CO2 22*      BUN 25*   CREATININE 1.0   ALKPHOS 88   ALT 16   AST 13   BILITOT 0.2       Significant Diagnostics:  I have reviewed all  pertinent imaging results/findings within the past 24 hours.

## 2022-09-03 VITALS
SYSTOLIC BLOOD PRESSURE: 117 MMHG | OXYGEN SATURATION: 93 % | WEIGHT: 153 LBS | RESPIRATION RATE: 18 BRPM | BODY MASS INDEX: 25.49 KG/M2 | TEMPERATURE: 97 F | HEART RATE: 81 BPM | HEIGHT: 65 IN | DIASTOLIC BLOOD PRESSURE: 70 MMHG

## 2022-09-03 LAB
ALBUMIN SERPL BCP-MCNC: 3.3 G/DL (ref 3.5–5.2)
ALP SERPL-CCNC: 75 U/L (ref 55–135)
ALT SERPL W/O P-5'-P-CCNC: 14 U/L (ref 10–44)
ANION GAP SERPL CALC-SCNC: 8 MMOL/L (ref 8–16)
APTT BLDCRRT: 45.8 SEC (ref 21–32)
APTT BLDCRRT: 46.9 SEC (ref 21–32)
APTT BLDCRRT: 46.9 SEC (ref 21–32)
AST SERPL-CCNC: 13 U/L (ref 10–40)
BASOPHILS # BLD AUTO: 0.05 K/UL (ref 0–0.2)
BASOPHILS # BLD AUTO: 0.05 K/UL (ref 0–0.2)
BASOPHILS NFR BLD: 0.4 % (ref 0–1.9)
BASOPHILS NFR BLD: 0.4 % (ref 0–1.9)
BILIRUB SERPL-MCNC: 0.5 MG/DL (ref 0.1–1)
BUN SERPL-MCNC: 15 MG/DL (ref 8–23)
CALCIUM SERPL-MCNC: 9 MG/DL (ref 8.7–10.5)
CHLORIDE SERPL-SCNC: 108 MMOL/L (ref 95–110)
CO2 SERPL-SCNC: 23 MMOL/L (ref 23–29)
CREAT SERPL-MCNC: 0.8 MG/DL (ref 0.5–1.4)
DIFFERENTIAL METHOD: ABNORMAL
DIFFERENTIAL METHOD: ABNORMAL
EOSINOPHIL # BLD AUTO: 0.1 K/UL (ref 0–0.5)
EOSINOPHIL # BLD AUTO: 0.1 K/UL (ref 0–0.5)
EOSINOPHIL NFR BLD: 1 % (ref 0–8)
EOSINOPHIL NFR BLD: 1 % (ref 0–8)
ERYTHROCYTE [DISTWIDTH] IN BLOOD BY AUTOMATED COUNT: 13.5 % (ref 11.5–14.5)
ERYTHROCYTE [DISTWIDTH] IN BLOOD BY AUTOMATED COUNT: 13.5 % (ref 11.5–14.5)
EST. GFR  (NO RACE VARIABLE): >60 ML/MIN/1.73 M^2
GLUCOSE SERPL-MCNC: 94 MG/DL (ref 70–110)
HCT VFR BLD AUTO: 44.8 % (ref 40–54)
HCT VFR BLD AUTO: 44.8 % (ref 40–54)
HGB BLD-MCNC: 15.5 G/DL (ref 14–18)
HGB BLD-MCNC: 15.5 G/DL (ref 14–18)
IMM GRANULOCYTES # BLD AUTO: 0.05 K/UL (ref 0–0.04)
IMM GRANULOCYTES # BLD AUTO: 0.05 K/UL (ref 0–0.04)
IMM GRANULOCYTES NFR BLD AUTO: 0.4 % (ref 0–0.5)
IMM GRANULOCYTES NFR BLD AUTO: 0.4 % (ref 0–0.5)
LYMPHOCYTES # BLD AUTO: 4.8 K/UL (ref 1–4.8)
LYMPHOCYTES # BLD AUTO: 4.8 K/UL (ref 1–4.8)
LYMPHOCYTES NFR BLD: 38.9 % (ref 18–48)
LYMPHOCYTES NFR BLD: 38.9 % (ref 18–48)
MCH RBC QN AUTO: 30.6 PG (ref 27–31)
MCH RBC QN AUTO: 30.6 PG (ref 27–31)
MCHC RBC AUTO-ENTMCNC: 34.6 G/DL (ref 32–36)
MCHC RBC AUTO-ENTMCNC: 34.6 G/DL (ref 32–36)
MCV RBC AUTO: 89 FL (ref 82–98)
MCV RBC AUTO: 89 FL (ref 82–98)
MONOCYTES # BLD AUTO: 1.1 K/UL (ref 0.3–1)
MONOCYTES # BLD AUTO: 1.1 K/UL (ref 0.3–1)
MONOCYTES NFR BLD: 8.7 % (ref 4–15)
MONOCYTES NFR BLD: 8.7 % (ref 4–15)
NEUTROPHILS # BLD AUTO: 6.3 K/UL (ref 1.8–7.7)
NEUTROPHILS # BLD AUTO: 6.3 K/UL (ref 1.8–7.7)
NEUTROPHILS NFR BLD: 50.6 % (ref 38–73)
NEUTROPHILS NFR BLD: 50.6 % (ref 38–73)
NRBC BLD-RTO: 0 /100 WBC
NRBC BLD-RTO: 0 /100 WBC
PLATELET # BLD AUTO: 272 K/UL (ref 150–450)
PLATELET # BLD AUTO: 272 K/UL (ref 150–450)
PMV BLD AUTO: 8.9 FL (ref 9.2–12.9)
PMV BLD AUTO: 8.9 FL (ref 9.2–12.9)
POTASSIUM SERPL-SCNC: 4 MMOL/L (ref 3.5–5.1)
PROT SERPL-MCNC: 6.5 G/DL (ref 6–8.4)
RBC # BLD AUTO: 5.06 M/UL (ref 4.6–6.2)
RBC # BLD AUTO: 5.06 M/UL (ref 4.6–6.2)
SODIUM SERPL-SCNC: 139 MMOL/L (ref 136–145)
WBC # BLD AUTO: 12.35 K/UL (ref 3.9–12.7)
WBC # BLD AUTO: 12.35 K/UL (ref 3.9–12.7)

## 2022-09-03 PROCEDURE — G0378 HOSPITAL OBSERVATION PER HR: HCPCS

## 2022-09-03 PROCEDURE — 96366 THER/PROPH/DIAG IV INF ADDON: CPT

## 2022-09-03 PROCEDURE — 80053 COMPREHEN METABOLIC PANEL: CPT

## 2022-09-03 PROCEDURE — 85025 COMPLETE CBC W/AUTO DIFF WBC: CPT

## 2022-09-03 PROCEDURE — 85730 THROMBOPLASTIN TIME PARTIAL: CPT

## 2022-09-03 PROCEDURE — 94761 N-INVAS EAR/PLS OXIMETRY MLT: CPT

## 2022-09-03 PROCEDURE — 99232 PR SUBSEQUENT HOSPITAL CARE,LEVL II: ICD-10-PCS | Mod: GC,,, | Performed by: SURGERY

## 2022-09-03 PROCEDURE — 25000003 PHARM REV CODE 250

## 2022-09-03 PROCEDURE — 96361 HYDRATE IV INFUSION ADD-ON: CPT

## 2022-09-03 PROCEDURE — 99232 SBSQ HOSP IP/OBS MODERATE 35: CPT | Mod: GC,,, | Performed by: SURGERY

## 2022-09-03 PROCEDURE — 85730 THROMBOPLASTIN TIME PARTIAL: CPT | Mod: 91 | Performed by: SURGERY

## 2022-09-03 PROCEDURE — 96365 THER/PROPH/DIAG IV INF INIT: CPT

## 2022-09-03 PROCEDURE — 63600175 PHARM REV CODE 636 W HCPCS

## 2022-09-03 PROCEDURE — 36415 COLL VENOUS BLD VENIPUNCTURE: CPT | Performed by: SURGERY

## 2022-09-03 RX ORDER — NAPROXEN SODIUM 220 MG/1
81 TABLET, FILM COATED ORAL DAILY
Qty: 360 TABLET | Refills: 0 | Status: SHIPPED | OUTPATIENT
Start: 2022-09-03 | End: 2024-03-21

## 2022-09-03 RX ORDER — HEPARIN SODIUM,PORCINE/D5W 25000/250
16 INTRAVENOUS SOLUTION INTRAVENOUS CONTINUOUS
Status: DISCONTINUED | OUTPATIENT
Start: 2022-09-03 | End: 2022-09-03

## 2022-09-03 RX ADMIN — HEPARIN SODIUM 16 UNITS/KG/HR: 5000 INJECTION INTRAVENOUS; SUBCUTANEOUS at 06:09

## 2022-09-03 NOTE — PROGRESS NOTES
[facility]  Vascular Surgery  Progress Note    Patient Name: Elijah Love  MRN: [id number]  Admission Date: 9/2/2022  Primary Care Provider: [facility] MD    Subjective:     Interval History:   No acute events overnight.   AFVSS.  Patient with improvement in symptoms after heparin gtt and fluid resuscitation.  Able to ambulate to bathroom with ease.      Post-Op Info:  * No surgery found *           Medications:  Continuous Infusions:  Scheduled Meds:  PRN Meds:     Objective:     Vital Signs (Most Recent):  Temp: 97.4 °F (36.3 °C) (09/03/22 0802)  Pulse: 79 (09/03/22 0802)  Resp: 18 (09/03/22 0802)  BP: 132/75 (09/03/22 0802)  SpO2: 98 % (09/03/22 0802) Vital Signs (24h Range):  Temp:  [97.4 °F (36.3 °C)-98.7 °F (37.1 °C)] 97.4 °F (36.3 °C)  Pulse:  [] 79  Resp:  [18-28] 18  SpO2:  [94 %-98 %] 98 %  BP: (109-153)/(67-98) 132/75     Date 09/03/22 0700 - 09/04/22 0659   Shift 8748-6420 4579-2381 8411-7742 24 Hour Total   INTAKE   Shift Total(mL/kg)       OUTPUT   Urine(mL/kg/hr) 200   200   Shift Total(mL/kg) 200(2.9)   200(2.9)   Weight (kg) 69.4 69.4 69.4 69.4       Physical Exam  Constitutional:       General: He is not in acute distress.  HENT:      Head: Normocephalic and atraumatic.   Eyes:      Extraocular Movements: Extraocular movements intact.      Conjunctiva/sclera: Conjunctivae normal.      Pupils: Pupils are equal, round, and reactive to light.   Cardiovascular:      Rate and Rhythm: Normal rate and regular rhythm.      Comments: L PT biphasic   Pulmonary:      Effort: Pulmonary effort is normal. No respiratory distress.   Abdominal:      General: There is no distension.      Palpations: Abdomen is soft.   Musculoskeletal:      Comments: LLE 5/5 on dorsi and plantarflexion   Left foot warmer   Sensory intact   Neurological:      General: No focal deficit present.      Mental Status: He is alert.       Significant Labs:  CBC:   Recent Labs   Lab 09/03/22  0113   WBC 12.35  12.35    RBC 5.06  5.06   HGB 15.5  15.5   HCT 44.8  44.8     272   MCV 89  89   MCH 30.6  30.6   MCHC 34.6  34.6     CMP:   Recent Labs   Lab 09/03/22  0113   GLU 94   CALCIUM 9.0   ALBUMIN 3.3*   PROT 6.5      K 4.0   CO2 23      BUN 15   CREATININE 0.8   ALKPHOS 75   ALT 14   AST 13   BILITOT 0.5       Significant Diagnostics:  I have reviewed all pertinent imaging results/findings within the past 24 hours.    Assessment/Plan:     * Lower limb ischemia  Patient is a 66 M with hx of hyperlipidemia, smoking hx of 40+ yrs, who is demonstrating symptoms of acute on chronic lower limb ischemia in his left leg. His imaging showed triphasic wave forms CFA, SFA, mid SFA, with distal SFA occlusion with POP and tibial monophasic signals.  Patient only with claudication symptoms prior to ED admit and denies prior rest pain. Will manage with heparin gtt and fluid resuscitation. Started on heparin gtt on admit and with improvement in symptoms.       Plan  -- Patient showing improvement in symptoms this AM and now with PT signal on doppler. Okay to discontinue heparin gtt.   -- Patient unable to swallow pills. Will have him start daily chewable ASA 81mg at discharge.   -- d/c IVF  -- Have patient ambulate in hallway with nurse.   -- Regular diet    Dispo: Will re-evaluate at noon. If able to ambulate well then stable for discharge. Will have patient follow up in clinic with Dr. Granado in 2 weeks.         Maria Guadalupe Ramirez MD  Vascular Surgery  [facility]

## 2022-09-03 NOTE — PLAN OF CARE
Patient AOX4, GCS 15, ambulates independently with SBA for safety.  VSS, afebrile, no c/o CP or SOB, tolerating room air. Adequate urine output, patient states last BM 9/2. Receiving IVF and Heparin gtt.     Patient transferred from OSH already receiving heparin gtt 16 un/kg/hr, vascular surgery's note states to have gtt started at this same rate, patient arrives from ED with heparin running at this rate, order is placed for 17 un/kg/hr. RN notifies MD on call who agrees to change order to match the rate being given currently as PTT is therapeutic.     On arrival LLE is cool and no pulse palpable or heard with doppler, RN asks MD if frequent doppler checks are needed as no orders are yet placed MD states that patient has just arrived and admission orders may not have yet been placed but to perform neurovascular check to LLE q4h until more orders are placed. RN also asks about diet status and is told to keep patient NPO at midnight in case of surgery in am. Patient given water until midnight, bed side swallow study done, no aspiration sx noted.     Patients states that pain in leg has resolved at rest. All questions answered, patient resting comfortably at this time, denies further needs.

## 2022-09-03 NOTE — PROGRESS NOTES
Dr. Granado bedside. Stated to d/c IVF and heparin gtt and ambulate pt. Per MD orders, call MD if pt has increased pain at rest in lower extremity. Mds will reassess around noon. Pt verbalized understanding of the above.    RN ambulated pt, pt independent.    Wctm.

## 2022-09-03 NOTE — NURSING
RN spoke to both MD on call and pharmacy to verify that order on EMAR matches Heparin gtt that patient has running. Patient is receiving 16 un/kg/hr at a rate of 11.5 un/hr and is therapeutic according to the last PTT.

## 2022-09-03 NOTE — ASSESSMENT & PLAN NOTE
Patient is a 66 M with hx of hyperlipidemia, smoking hx of 40+ yrs, who is demonstrating symptoms of acute on chronic lower limb ischemia in his left leg. His imaging showed triphasic wave forms CFA, SFA, mid SFA, with distal SFA occlusion with POP and tibial monophasic signals.  Patient only with claudication symptoms prior to ED admit and denies prior rest pain. Will manage with heparin gtt and fluid resuscitation. Started on heparin gtt on admit and with improvement in symptoms.       Plan  -- Patient showing improvement in symptoms this AM and now with PT signal on doppler. Okay to discontinue heparin gtt.   -- Patient unable to swallow pills. Will have him start daily chewable ASA 81mg at discharge.   -- d/c IVF  -- Have patient ambulate in hallway with nurse.   -- Regular diet    Dispo: Will re-evaluate at noon. If able to ambulate well then stable for discharge. Will have patient follow up in clinic with Dr. Granado in 2 weeks.

## 2022-09-03 NOTE — PLAN OF CARE
On call CM requested to get  LYFT ride for patient.  CM ordered LYFT for 2:20, with expected arrival at the ED between 2:20 and 2:35.

## 2022-09-03 NOTE — DISCHARGE SUMMARY
[address]  [facility]  DISCHARGE SUMMARY  General Surgery      Admit Date:  9/2/2022    Discharge Date and Time:  9/3/2022  2:00 PM    Attending Physician:  ALEXIS Granado II, MD     Discharge Provider:  Maria Guadalupe Ramirez MD     Reason for Admission:  Lower limb ischemia     Procedures Performed:  * No surgery found *    Hospital Course:  Please see the preoperative H&P and other available documentation for full details related to history prior to this admission.  Briefly, Elijah Love is a 66 y.o. male who was admitted for Lower limb ischemia    Patient was admitted and started on heparin gtt and IVF. Patient had improvement in symptoms within hours and had resolution of symptoms by the next day. The patient's labs and vital signs remained stable and appropriate throughout their hospital stay. Patient was ambulatory and denied any pain. By  9/3/22  the patient was deemed okay for discharge and instructed to follow up in clinic in 2 weeks.        Consults:  None.    Significant Diagnostic Studies:   Recent Labs   Lab 09/02/22  1000 09/03/22  0113   WBC 11.43 12.35  12.35   HGB 17.0 15.5  15.5   HCT 49.2 44.8  44.8    272  272     Recent Labs   Lab 09/02/22  1000 09/03/22  0113    139   K 4.2 4.0    108   CO2 22* 23   BUN 25* 15   CREATININE 1.0 0.8    94   CALCIUM 9.3 9.0   AST 13 13   ALT 16 14   ALKPHOS 88 75   BILITOT 0.2 0.5   PROT 8.0 6.5   ALBUMIN 3.8 3.3*     Recent Labs   Lab 09/02/22  1000 09/02/22  1841   INR 1.0 1.1   No results for input(s): PH, PCO2, PO2, HCO3 in the last 72 hours.      Final Diagnoses:   Principal Problem:    Lower limb ischemia   Secondary Diagnoses:    Active Hospital Problems    Diagnosis  POA    *Lower limb ischemia [I99.8]  Unknown      Resolved Hospital Problems   No resolved problems to display.       Discharged Condition:  Good    Disposition:  Home or Self Care    Follow Up/Patient Instructions:     Medications:  Reconciled Home Medications:     Current Discharge Medication List        START taking these medications    Details   aspirin 81 MG Chew Take 1 tablet (81 mg total) by mouth once daily.  Qty: 360 tablet, Refills: 0           CONTINUE these medications which have NOT CHANGED    Details   gabapentin (NEURONTIN) 250 mg/5 mL solution 1/2 tsp po tid for pain or numbness if needed can increase to 1 tsp po qd prn numbness  Qty: 470 mL, Refills: 5      atorvastatin (LIPITOR) 10 MG tablet Take 1 tablet (10 mg total) by mouth once daily.  Qty: 90 tablet, Refills: 3      fenofibrate (TRICOR) 145 MG tablet Take 1 tablet (145 mg total) by mouth once daily.  Qty: 30 tablet, Refills: 5      prednisoLONE (PRELONE) 15 mg/5 mL syrup 1 tsp p.o. q.d. times 10 days  Qty: 50 mL, Refills: 0           Discharge Procedure Orders   Diet Adult Regular     Notify your health care provider if you experience any of the following:  increased confusion or weakness     Notify your health care provider if you experience any of the following:  persistent dizziness, light-headedness, or visual disturbances     Notify your health care provider if you experience any of the following:  worsening rash     Notify your health care provider if you experience any of the following:  severe persistent headache     Notify your health care provider if you experience any of the following:  difficulty breathing or increased cough     Notify your health care provider if you experience any of the following:  redness, tenderness, or signs of infection (pain, swelling, redness, odor or green/yellow discharge around incision site)     Notify your health care provider if you experience any of the following:  severe uncontrolled pain     Notify your health care provider if you experience any of the following:  persistent nausea and vomiting or diarrhea     Notify your health care provider if you experience any of the following:  temperature >100.4     Activity as tolerated      Follow-up Information        ALEXIS Granado II, MD Follow up in 2 week(s).    Specialty: Vascular Surgery  Contact information:  [address] [place] [address] [id number] [phone]                             Maria Guadalupe Ramirez MD

## 2022-09-03 NOTE — SUBJECTIVE & OBJECTIVE
Medications:  Continuous Infusions:  Scheduled Meds:  PRN Meds:     Objective:     Vital Signs (Most Recent):  Temp: 97.4 °F (36.3 °C) (09/03/22 0802)  Pulse: 79 (09/03/22 0802)  Resp: 18 (09/03/22 0802)  BP: 132/75 (09/03/22 0802)  SpO2: 98 % (09/03/22 0802) Vital Signs (24h Range):  Temp:  [97.4 °F (36.3 °C)-98.7 °F (37.1 °C)] 97.4 °F (36.3 °C)  Pulse:  [] 79  Resp:  [18-28] 18  SpO2:  [94 %-98 %] 98 %  BP: (109-153)/(67-98) 132/75     Date 09/03/22 0700 - 09/04/22 0659   Shift 5317-5290 9095-8988 5620-8834 24 Hour Total   INTAKE   Shift Total(mL/kg)       OUTPUT   Urine(mL/kg/hr) 200   200   Shift Total(mL/kg) 200(2.9)   200(2.9)   Weight (kg) 69.4 69.4 69.4 69.4       Physical Exam  Constitutional:       General: He is not in acute distress.  HENT:      Head: Normocephalic and atraumatic.   Eyes:      Extraocular Movements: Extraocular movements intact.      Conjunctiva/sclera: Conjunctivae normal.      Pupils: Pupils are equal, round, and reactive to light.   Cardiovascular:      Rate and Rhythm: Normal rate and regular rhythm.      Comments: L PT biphasic   Pulmonary:      Effort: Pulmonary effort is normal. No respiratory distress.   Abdominal:      General: There is no distension.      Palpations: Abdomen is soft.   Musculoskeletal:      Comments: LLE 5/5 on dorsi and plantarflexion   Left foot warmer   Sensory intact   Neurological:      General: No focal deficit present.      Mental Status: He is alert.       Significant Labs:  CBC:   Recent Labs   Lab 09/03/22 0113   WBC 12.35  12.35   RBC 5.06  5.06   HGB 15.5  15.5   HCT 44.8  44.8     272   MCV 89  89   MCH 30.6  30.6   MCHC 34.6  34.6     CMP:   Recent Labs   Lab 09/03/22 0113   GLU 94   CALCIUM 9.0   ALBUMIN 3.3*   PROT 6.5      K 4.0   CO2 23      BUN 15   CREATININE 0.8   ALKPHOS 75   ALT 14   AST 13   BILITOT 0.5       Significant Diagnostics:  I have reviewed all pertinent imaging results/findings within the  past 24 hours.

## 2022-09-03 NOTE — PLAN OF CARE
Patient discharged to home with instructions to set up output follow up in 2 weeks. Discharge instructions verbally given and hard copy provided to patient. Prescription sent to home pharmacy. Removed bilateral IVs with cath tips in place. Patient tolerated IV removals well with bleeding controlled. Patient remains free of falls with no acute pain or distress noted.     Able to doppler LT LE pulse and palpate RT LE. Pt denies having foot pain while resting and after ambulating. Mds bedside to assess prior to discharging pt. Both feet warm to touch and cap refill WNL.

## 2022-09-03 NOTE — ED NOTES
apTT resulted at 50.8  appT 6 hours post start of infusion is within therapeutic range and warrants no dosage change.  However, current ordered dose is 17 unit/kg/hour, so nurse to adjust it as such.  Next apTT due in 6 hours per nomogram at midnight 0000 on 09/03.

## 2022-09-06 PROBLEM — I70.212 ATHEROSCLEROSIS OF NATIVE ARTERIES OF EXTREMITIES WITH INTERMITTENT CLAUDICATION, LEFT LEG: Status: ACTIVE | Noted: 2022-09-06

## 2022-09-16 ENCOUNTER — OFFICE VISIT (OUTPATIENT)
Dept: VASCULAR SURGERY | Facility: CLINIC | Age: 67
End: 2022-09-16
Attending: SURGERY
Payer: MEDICARE

## 2022-09-16 VITALS
HEIGHT: 65 IN | SYSTOLIC BLOOD PRESSURE: 129 MMHG | TEMPERATURE: 98 F | WEIGHT: 154.31 LBS | BODY MASS INDEX: 25.71 KG/M2 | HEART RATE: 97 BPM | DIASTOLIC BLOOD PRESSURE: 73 MMHG

## 2022-09-16 DIAGNOSIS — Z72.0 TOBACCO ABUSE: ICD-10-CM

## 2022-09-16 DIAGNOSIS — F17.210 NICOTINE DEPENDENCE, CIGARETTES, UNCOMPLICATED: ICD-10-CM

## 2022-09-16 DIAGNOSIS — I70.212 ATHEROSCLEROSIS OF NATIVE ARTERIES OF EXTREMITIES WITH INTERMITTENT CLAUDICATION, LEFT LEG: Primary | ICD-10-CM

## 2022-09-16 PROCEDURE — 99212 PR OFFICE/OUTPT VISIT, EST, LEVL II, 10-19 MIN: ICD-10-PCS | Mod: S$PBB,,, | Performed by: SURGERY

## 2022-09-16 PROCEDURE — 99999 PR PBB SHADOW E&M-EST. PATIENT-LVL III: ICD-10-PCS | Mod: PBBFAC,,, | Performed by: SURGERY

## 2022-09-16 PROCEDURE — 99999 PR PBB SHADOW E&M-EST. PATIENT-LVL III: CPT | Mod: PBBFAC,,, | Performed by: SURGERY

## 2022-09-16 PROCEDURE — 99213 OFFICE O/P EST LOW 20 MIN: CPT | Mod: PBBFAC | Performed by: SURGERY

## 2022-09-16 PROCEDURE — 99212 OFFICE O/P EST SF 10 MIN: CPT | Mod: S$PBB,,, | Performed by: SURGERY

## 2022-09-16 NOTE — PROGRESS NOTES
VASCULAR SURGERY NOTE    Patient ID: Elijah Love is a 66 y.o. male.    I. HISTORY     Chief Complaint: LLE claudication    HPI: Elijah Love is a 66 y.o. male who is here today for follow up appointment after recent hospitalization on 9/3/22 for acute on chronic lower limb ischemia of his left leg. Prior to presentation at that time, he complained of claudication symptoms and was without any history of rest pain. He was treated with heparin gtt and fluid resuscitation in which he had improvement of symptoms. He is currently taking a chewable baby ASA daily. Due to inability to tolerate swallowing pills, he was not started on a statin.     Patient states he has been doing better since he was discharged. He complains of leg pain after walking about a block or so. States he started having calf and shin pain after walking from the garage to the hospital entrance. The pain resolves with rest. The quality of the pain is crampy. The pain starts on the calf first. Since discharge, patient reports he has kept his hydration up and only drinks water instead of tea now. He also has cut back smoking from 1.5 pack/day to only about 5 cigarettes/day!!! I applauded him on his efforts and encouraged him to continue decreasing down to quitting.  States he is motivated to fully quit but is finding it hard to get through these last few cigarettes to completely quit.          Past Medical History:   Diagnosis Date    Kidney stones         No past surgical history on file.    Social History     Occupational History    Not on file   Tobacco Use    Smoking status: Every Day    Smokeless tobacco: Never   Substance and Sexual Activity    Alcohol use: Yes    Drug use: Never    Sexual activity: Yes       Review of Systems   Constitutional: Negative for chills, decreased appetite, fever, malaise/fatigue and weight loss.   HENT:  Negative for congestion, ear pain, hoarse voice, nosebleeds and sore throat.    Eyes:  Negative for blurred  vision, discharge, pain, photophobia and visual disturbance.   Cardiovascular:  Positive for claudication. Negative for chest pain, dyspnea on exertion, irregular heartbeat, leg swelling, palpitations and syncope.   Respiratory:  Negative for cough, shortness of breath and wheezing.    Endocrine: Negative for cold intolerance, heat intolerance and polyphagia.   Hematologic/Lymphatic: Negative for adenopathy. Does not bruise/bleed easily.   Skin:  Negative for itching and rash.   Musculoskeletal:  Positive for muscle cramps. Negative for back pain, joint swelling and muscle weakness.   Gastrointestinal:  Negative for abdominal pain, constipation, diarrhea, nausea and vomiting.   Genitourinary:  Negative for dysuria, flank pain, frequency and urgency.   Neurological:  Negative for focal weakness, headaches, numbness, seizures and weakness.       II. PHYSICAL EXAM     Physical Exam  Constitutional:       General: He is not in acute distress.  HENT:      Head: Normocephalic and atraumatic.      Mouth/Throat:      Mouth: Mucous membranes are moist.      Pharynx: Oropharynx is clear.   Eyes:      General: No scleral icterus.        Right eye: No discharge.         Left eye: No discharge.      Extraocular Movements: Extraocular movements intact.      Conjunctiva/sclera: Conjunctivae normal.   Cardiovascular:      Rate and Rhythm: Normal rate and regular rhythm.      Comments: Bilateral 2+ femoral pulses   L monophasic AT and PT  R biphasic DP and triphasic PT  Abdominal:      General: Abdomen is flat. There is no distension.      Palpations: Abdomen is soft.      Tenderness: There is no abdominal tenderness.   Musculoskeletal:      Cervical back: Normal range of motion and neck supple.      Comments: 5/5 bilateral LLE strength   Neurological:      General: No focal deficit present.      Mental Status: He is alert and oriented to person, place, and time.   Psychiatric:         Mood and Affect: Mood normal.         Behavior:  Behavior normal.       III. ASSESSMENT & PLAN (MEDICAL DECISION MAKING)     1. Atherosclerosis of native arteries of extremities with intermittent claudication, left leg    2. Tobacco abuse    3. Nicotine dependence, cigarettes, uncomplicated        Imaging Results: (I have personally reviewed the images/studies and provided my interpretation below)    U/S 9/2: significant stenosis in distal SFA, popliteal artery and PT/AT.    Assessment/Diagnosis and Plan:  66 y.o. male with LLE claudication. Currently symptoms are manageable. Explained importance of smoking abstinence to improve durability of any surgical treatment if necessary in the future as well as to reduce risk of worsening PAD, cerebrovascular disease, coronary disease, COPD etc. patient expressed understanding and was in agreement with the balloon treatment plan    -Cilostazol 50mg BID (ok to crush and put into apple sauce)  -Walking program at least 4x/week for 30min per session. Instructions provided  -Patient unable to swallow pills. Continue chewable ASA 81 mg daily. Recommended for all patients with PAD  -Tobacco cessation clinic referral sent. Continue to work on cutting smoking down until fully quit.   -High intensity statin (atorvastatin 40mg or rosuvastatin 20mg) recommended for all patients with symptomatic PAD. Will send refer to Dr. Dennis Mckeon to assist with medication management as patient unable to take PO statin and may be a candidate for praluent since it is an injection.  -Control of atherosclerotic risk factors: Goal LDL <100, Goal HbA1C <7, Goal BP <140/90  -Follow up in 6 weeks with ALESSANDRA and re-evaluation of symptoms      Maria Guadalupe Ramirez MD  General Surgery, PGY-3    ALEXIS Granado II, MD, VI  Vascular Surgeon  [facility]

## 2022-09-19 DIAGNOSIS — E78.5 HYPERLIPIDEMIA, UNSPECIFIED HYPERLIPIDEMIA TYPE: Primary | ICD-10-CM

## 2022-09-19 RX ORDER — CILOSTAZOL 50 MG/1
50 TABLET ORAL 2 TIMES DAILY
Qty: 60 TABLET | Refills: 11 | Status: SHIPPED | OUTPATIENT
Start: 2022-09-19 | End: 2023-09-18 | Stop reason: SDUPTHER

## 2022-09-20 DIAGNOSIS — I70.212 ATHEROSCLEROSIS OF NATIVE ARTERIES OF EXTREMITIES WITH INTERMITTENT CLAUDICATION, LEFT LEG: Primary | ICD-10-CM

## 2022-09-26 ENCOUNTER — CLINICAL SUPPORT (OUTPATIENT)
Dept: SMOKING CESSATION | Facility: CLINIC | Age: 67
End: 2022-09-26

## 2022-09-26 DIAGNOSIS — F17.200 NICOTINE DEPENDENCE: Primary | ICD-10-CM

## 2022-09-26 PROCEDURE — 99404 PR PREVENT COUNSEL,INDIV,60 MIN: ICD-10-PCS | Mod: S$GLB,,,

## 2022-09-26 PROCEDURE — 99404 PREV MED CNSL INDIV APPRX 60: CPT | Mod: S$GLB,,,

## 2022-09-26 RX ORDER — IBUPROFEN 200 MG
1 TABLET ORAL DAILY
Qty: 30 PATCH | Refills: 0 | Status: SHIPPED | OUTPATIENT
Start: 2022-09-26 | End: 2022-10-24 | Stop reason: SDUPTHER

## 2022-09-26 NOTE — PROGRESS NOTES
See Tobacco Cessation Intake Form for patient assessment and recommendations.  Exhaled carbon monoxide level was 13 ppm per [facility].

## 2022-09-26 NOTE — Clinical Note
Pt seen at intake today. He currently smokes 20 cigs/day. Discussed tobacco cessation medication of 21 mg nicotine patch QD. Pt started on rate reduction and wait time of 15 min prior to smoking. Exhaled carbon monoxide level was 13 (0-6 non-smoker). Will see pt back in office in 1 wk.

## 2022-09-28 ENCOUNTER — OFFICE VISIT (OUTPATIENT)
Dept: CARDIOLOGY | Facility: CLINIC | Age: 67
End: 2022-09-28
Payer: MEDICARE

## 2022-09-28 VITALS
DIASTOLIC BLOOD PRESSURE: 82 MMHG | BODY MASS INDEX: 26.45 KG/M2 | HEART RATE: 92 BPM | SYSTOLIC BLOOD PRESSURE: 140 MMHG | WEIGHT: 158.75 LBS | HEIGHT: 65 IN

## 2022-09-28 DIAGNOSIS — E78.5 HYPERLIPIDEMIA, UNSPECIFIED HYPERLIPIDEMIA TYPE: Primary | ICD-10-CM

## 2022-09-28 DIAGNOSIS — M79.662 PAIN IN LEFT LOWER LEG: ICD-10-CM

## 2022-09-28 DIAGNOSIS — E78.49 OTHER HYPERLIPIDEMIA: ICD-10-CM

## 2022-09-28 DIAGNOSIS — Z72.0 TOBACCO ABUSE: ICD-10-CM

## 2022-09-28 DIAGNOSIS — I70.212 ATHEROSCLEROSIS OF NATIVE ARTERIES OF EXTREMITIES WITH INTERMITTENT CLAUDICATION, LEFT LEG: ICD-10-CM

## 2022-09-28 DIAGNOSIS — I99.8 LOWER LIMB ISCHEMIA: ICD-10-CM

## 2022-09-28 DIAGNOSIS — Z78.9 STATIN INTOLERANCE: ICD-10-CM

## 2022-09-28 PROCEDURE — 99213 OFFICE O/P EST LOW 20 MIN: CPT | Mod: PBBFAC,PO | Performed by: INTERNAL MEDICINE

## 2022-09-28 PROCEDURE — 99205 OFFICE O/P NEW HI 60 MIN: CPT | Mod: S$PBB,,, | Performed by: INTERNAL MEDICINE

## 2022-09-28 PROCEDURE — 99999 PR PBB SHADOW E&M-EST. PATIENT-LVL III: ICD-10-PCS | Mod: PBBFAC,,, | Performed by: INTERNAL MEDICINE

## 2022-09-28 PROCEDURE — 99999 PR PBB SHADOW E&M-EST. PATIENT-LVL III: CPT | Mod: PBBFAC,,, | Performed by: INTERNAL MEDICINE

## 2022-09-28 PROCEDURE — 99205 PR OFFICE/OUTPT VISIT, NEW, LEVL V, 60-74 MIN: ICD-10-PCS | Mod: S$PBB,,, | Performed by: INTERNAL MEDICINE

## 2022-09-28 NOTE — PATIENT INSTRUCTIONS
Assessment/Plan:  Elijah Love is a 67 y.o. male with PAD s/p hospitalization for acute on chronic lower limb ischemia of his left leg (9/2/2022), HLD, smoking, overweight, who presents for an initial appointment.     Hyperlipidemia- Labs from 2/1/2021 show total cholesterol 264 with  .  Mr. Love states he was unable to tolerate statins and fenofibrate due to issues with the size of the pills.  Start bempedoic acid 180 mg daily.       2. PAD- Mr. Love reports improvement in LLE claudication symptoms since hospital discharge.  He has no rest pain or tissue loss.  Continue medical management of PAD with cilostazol 100 mg bid and ASA 81 mg daily.  Pt to start walking program with goal of at least 30 minutes a day/5 days a week.    3. Smoking- Encourage smoking cessation.    4. Overweight- Encourage diet, exercise, and weight loss.    Follow up in 3 months with lipids and cmp prior

## 2022-09-28 NOTE — PROGRESS NOTES
[facility]      Chief Complaint   Patient presents with    Peripheral Arterial Disease       Patient ID: Elijah Love is a 67 y.o. male with PAD s/p hospitalization for acute on chronic lower limb ischemia of his left leg (9/2/2022), HLD, smoking, overweight, who presents for an initial appointment.  Pertinent history/events are as follows:     -Pt kindly referred by Dr. Granado for evaluation of hyperlipidemia.    HPI:  Mr. Love reports improvement in LLE claudication symptoms since hospital discharge.  He has no rest pain or tissue loss.  Smokes 1 pack a day for total of 50 years.  States he now participating in smoking cessation.  Labs from 2/1/2021 show total cholesterol 264 with  .  Mr. Love states he was unable to tolerate statins and fenofibrate due to issues with the size of the pills.      Past Medical History:   Diagnosis Date    Kidney stones      No past surgical history on file.  Social History     Socioeconomic History    Marital status: Single   Tobacco Use    Smoking status: Every Day    Smokeless tobacco: Never   Substance and Sexual Activity    Alcohol use: Yes    Drug use: Never    Sexual activity: Yes     No family history on file.    Review of patient's allergies indicates:  No Known Allergies    Medication List with Changes/Refills   Current Medications    ASPIRIN 81 MG CHEW    Take 1 tablet (81 mg total) by mouth once daily.    CILOSTAZOL (PLETAL) 50 MG TAB    Take 1 tablet (50 mg total) by mouth 2 (two) times daily.    GABAPENTIN (NEURONTIN) 250 MG/5 ML SOLUTION    1/2 tsp po tid for pain or numbness if needed can increase to 1 tsp po qd prn numbness    NICOTINE (NICODERM CQ) 21 MG/24 HR    Place 1 patch onto the skin once daily.   Discontinued Medications    ATORVASTATIN (LIPITOR) 10 MG TABLET    Take 1 tablet (10 mg total) by mouth once daily.    FENOFIBRATE (TRICOR) 145 MG TABLET    Take 1 tablet (145 mg total) by mouth once daily.    PREDNISOLONE  "(PRELONE) 15 MG/5 ML SYRUP    1 tsp p.o. q.d. times 10 days       Review of Systems  Constitution: Denies chills, fever, and sweats.  HENT: Denies headaches or blurry vision.  Cardiovascular: Denies chest pain or irregular heart beat.  Respiratory: Denies cough or shortness of breath.  Gastrointestinal: Denies abdominal pain, nausea, or vomiting.  Musculoskeletal: Positive for BLE claudication (L>R).  Neurological: Denies dizziness or focal weakness.  Psychiatric/Behavioral: Normal mental status.  Hematologic/Lymphatic: Denies bleeding problem or easy bruising/bleeding.  Skin: Denies rash or suspicious lesions    Physical Examination  BP (!) 140/82   Pulse 92   Ht 5' 5" (1.651 m)   Wt 72 kg (158 lb 11.7 oz)   BMI 26.41 kg/m²     Constitutional: No acute distress, conversant  HEENT: Sclera anicteric, Pupils equal, round and reactive to light, extraocular motions intact, Oropharynx clear  Neck: No JVD, no carotid bruits  Cardiovascular: regular rate and rhythm, no murmur, rubs or gallops, normal S1/S2  Pulmonary: Clear to auscultation bilaterally  Abdominal: Abdomen soft, nontender, nondistended, positive bowel sounds  Extremities: No lower extremity edema   Pulses:  Carotid pulses are 2+ on the right side, and 2+ on the left side.  Radial pulses are 2+ on the right side, and 2+ on the left side.   Femoral pulses are 2+ on the right side, and 2+ on the left side.  Popliteal pulses are 2+ on the right side, and 2+ on the left side.   Dorsalis pedis pulses are 2+ on the right side, and 0 on the left side.   Posterior tibial pulses are 2+ on the right side, and 0 on the left side.    Skin: No ecchymosis, erythema, or ulcers  Psych: Alert and oriented x 3, appropriate affect  Neuro: CNII-XII intact, no focal deficits    Labs:  Most Recent Data  CBC:   Lab Results   Component Value Date    WBC 12.35 09/03/2022    WBC 12.35 09/03/2022    HGB 15.5 09/03/2022    HGB 15.5 09/03/2022    HCT 44.8 09/03/2022    HCT 44.8 " 09/03/2022     09/03/2022     09/03/2022    MCV 89 09/03/2022    MCV 89 09/03/2022    RDW 13.5 09/03/2022    RDW 13.5 09/03/2022     BMP:   Lab Results   Component Value Date     09/03/2022    K 4.0 09/03/2022     09/03/2022    CO2 23 09/03/2022    BUN 15 09/03/2022    CREATININE 0.8 09/03/2022    GLU 94 09/03/2022    CALCIUM 9.0 09/03/2022     LFTS;   Lab Results   Component Value Date    PROT 6.5 09/03/2022    ALBUMIN 3.3 (L) 09/03/2022    BILITOT 0.5 09/03/2022    AST 13 09/03/2022    ALKPHOS 75 09/03/2022    ALT 14 09/03/2022     COAGS:   Lab Results   Component Value Date    INR 1.1 09/02/2022     FLP:   Lab Results   Component Value Date    CHOL 264 (H) 02/01/2021    HDL 45 02/01/2021    LDLCALC 154 (H) 02/01/2021    TRIG 327 (H) 02/01/2021    CHOLHDL 17.0 (L) 02/01/2021     CARDIAC: No results found for: TROPONINI, CKMB, BNP    Imaging:    LLE Arterial Ultrasound 9/2/2022:  Significant stenosis in the distal SFA, popliteal artery, and calf arteries with monophasic dampened waveform and sluggish velocities.  Findings are suggestive of advanced atherosclerosis or thromboembolic process.  Suggest correlation with physical exam and vascular surgery evaluation as appropriate.   Nonvisualization of the left dorsalis pedis artery.    Assessment/Plan:  Elijah Love is a 67 y.o. male with PAD s/p hospitalization for acute on chronic lower limb ischemia of his left leg (9/2/2022), HLD, smoking, overweight, who presents for an initial appointment.     Hyperlipidemia- Labs from 2/1/2021 show total cholesterol 264 with  .  Mr. Love states he was unable to tolerate statins and fenofibrate due to issues with the size of the pills.  Start bempedoic acid 180 mg daily.       2. PAD- Mr. Love reports improvement in LLE claudication symptoms since hospital discharge.  He has no rest pain or tissue loss.  Continue medical management of PAD with cilostazol 100 mg bid and ASA 81 mg  daily.  Pt to start walking program with goal of at least 30 minutes a day/5 days a week.    3. Smoking- Encourage smoking cessation.    4. Overweight- Encourage diet, exercise, and weight loss.    Follow up in 3 months with lipids and cmp prior     Total duration of face to face visit time 30 minutes.  Total time spent counseling greater than fifty percent of total visit time.  Counseling included discussion regarding imaging findings, diagnosis, possibilities, treatment options, risks and benefits.  The patient had many questions regarding the options and long-term effects.    Dennis Mckeon MD, PhD  Interventional Cardiology

## 2022-09-29 ENCOUNTER — TELEPHONE (OUTPATIENT)
Dept: PHARMACY | Facility: CLINIC | Age: 67
End: 2022-09-29
Payer: MEDICARE

## 2022-10-03 ENCOUNTER — CLINICAL SUPPORT (OUTPATIENT)
Dept: SMOKING CESSATION | Facility: CLINIC | Age: 67
End: 2022-10-03
Payer: COMMERCIAL

## 2022-10-03 DIAGNOSIS — F17.200 NICOTINE DEPENDENCE: Primary | ICD-10-CM

## 2022-10-03 PROCEDURE — 99403 PR PREVENT COUNSEL,INDIV,45 MIN: ICD-10-PCS | Mod: S$GLB,,,

## 2022-10-03 PROCEDURE — 99403 PREV MED CNSL INDIV APPRX 45: CPT | Mod: S$GLB,,,

## 2022-10-03 NOTE — Clinical Note
Pt seen in office today. He continues to smoke 13 cigs/day. Pt remains on tobacco cessation medication of 21 mg nicotine patch QD. He states patches are keeping cravings down. No adverse effects/mental changes noted at this time. Pt asked to reduce current smoking rate by 2-3 cigs/day. Reviewed coping strategies/stress management/habitual behavior with patient. Exhaled carbon monoxide level was 15 ppm per Smoker[facility] (0-6 non-smoker). Will see pt back in office in 1 wk.

## 2022-10-03 NOTE — PROGRESS NOTES
Individual Follow-Up Form    10/3/2022    Clinical Status of Patient: Outpatient    Length of Service: 45 minutes    Continuing Medication: yes  Patches    Other Medications: none     Target Symptoms: Withdrawal and medication side effects. The following were  rated moderate (3) to severe (4) on TCRS:  Moderate (3): desire/crave tobacco  Severe (4): none    Comments:  Pt seen in office today. He continues to smoke 13 cigs/day. Pt remains on tobacco cessation medication of 21 mg nicotine patch QD. He states patches are keeping cravings down. No adverse effects/mental changes noted at this time. Pt asked to reduce current smoking rate by 2-3 cigs/day. Reviewed coping strategies/stress management/habitual behavior with patient. Exhaled carbon monoxide level was 15 ppm per [facility]oker[facility] (0-6 non-smoker). Will see pt back in office in 1 wk.      Diagnosis: F17.200    Next Visit: 1 week

## 2022-10-04 ENCOUNTER — TELEPHONE (OUTPATIENT)
Dept: CARDIOLOGY | Facility: CLINIC | Age: 67
End: 2022-10-04
Payer: MEDICARE

## 2022-10-04 NOTE — TELEPHONE ENCOUNTER
----- Message from Stephy Shrestha MA sent at 10/4/2022 12:13 PM CDT -----  Type:  Patient Returning Call    Who Called: pt  Does the patient know what this is regarding?: yes   Would the patient rather a call back or a response via [employer]?  Call back  Best Call Back Number: [phone]   Additional Information:  pt needs another medication called in because states bempedoic acid 180 mg Tab is too big for him to swallow. Please contact pt to update

## 2022-10-06 ENCOUNTER — SPECIALTY PHARMACY (OUTPATIENT)
Dept: PHARMACY | Facility: CLINIC | Age: 67
End: 2022-10-06
Payer: MEDICARE

## 2022-10-06 NOTE — TELEPHONE ENCOUNTER
Ayana, this is Adrienne Pena with [facility].  We are working on your prescription that your doctor has sent us. We will be working with your insurance to get this approved for you. We will be calling you along the way with updates on your medication.  If you have any questions, you can reach us at [phone].    Welcome call outcome: Patient/caregiver reached

## 2022-10-07 NOTE — TELEPHONE ENCOUNTER
Repatha PA denied on basis of not trialing atorvastatin or rosuvastatin at high-intensity dosing. Denial letter scanned into media. Appeal was submitted to plan. Expected decision 10/14

## 2022-10-10 ENCOUNTER — CLINICAL SUPPORT (OUTPATIENT)
Dept: SMOKING CESSATION | Facility: CLINIC | Age: 67
End: 2022-10-10
Payer: COMMERCIAL

## 2022-10-10 DIAGNOSIS — F17.200 NICOTINE DEPENDENCE: Primary | ICD-10-CM

## 2022-10-10 PROCEDURE — 99403 PR PREVENT COUNSEL,INDIV,45 MIN: ICD-10-PCS | Mod: S$GLB,,,

## 2022-10-10 PROCEDURE — 99403 PREV MED CNSL INDIV APPRX 45: CPT | Mod: S$GLB,,,

## 2022-10-10 NOTE — PROGRESS NOTES
Individual Follow-Up Form    10/10/2022    Clinical Status of Patient: Outpatient    Length of Service: 45 minutes    Continuing Medication: yes  Patches    Other Medications: none     Target Symptoms: Withdrawal and medication side effects. The following were  rated moderate (3) to severe (4) on TCRS:  Moderate (3): desire/crave tobacco  Severe (4): none    Comments:  Pt seen in office today. He continues to smoke 14 cigs/day. Pt remains on tobacco cessation medication of 21 mg nicotine patch QD. The patches seem to be working well. He states he forgot to put on a patch Sunday, but did not experience increased cravings. Congratulated him on his success and encouraged him to keep up the great work. No adverse effects/mental changes noted at this time. Pt asked to reduce current smoking rate by 2 cigs/day. Reviewed coping strategies/stress management/habitual behavior with patient. Exhaled carbon monoxide level was 11 ppm per Smoker[facility] (0-6 non-smoker). Will see pt back in office in 1 wk.      Diagnosis: F17.200    Next Visit: 1 week

## 2022-10-10 NOTE — Clinical Note
Pt seen in office today. He continues to smoke 14 cigs/day. Pt remains on tobacco cessation medication of 21 mg nicotine patch QD. The patches seem to be working well. He states he forgot to put on a patch Sunday, but did not experience increased cravings. Congratulated him on his success and encouraged him to keep up the great work. No adverse effects/mental changes noted at this time. Pt asked to reduce current smoking rate by 2 cigs/day. Reviewed coping strategies/stress management/habitual behavior with patient. Exhaled carbon monoxide level was 11 ppm per Smoker[facility] (0-6 non-smoker). Will see pt back in office in 1 wk.

## 2022-10-12 ENCOUNTER — SPECIALTY PHARMACY (OUTPATIENT)
Dept: PHARMACY | Facility: CLINIC | Age: 67
End: 2022-10-12
Payer: MEDICARE

## 2022-10-12 DIAGNOSIS — E78.49 OTHER HYPERLIPIDEMIA: Primary | ICD-10-CM

## 2022-10-12 NOTE — TELEPHONE ENCOUNTER
Repatha appeal approved from 1/1/22 - 10/11/2025 per  Sondra.     [employer]  Copay $4  Initial benefit  LIS 2  In network    Pending initial consult

## 2022-10-12 NOTE — TELEPHONE ENCOUNTER
[facility] Letter for COVID Symptoms Testing    11/9/2021      Dear Yin Ayala,     You have completed a COVID testing  At [facility] on 11/8/21, due to having symptoms consistent with a possible COVID-19 infection. You have been removed from work and you may NOT report to work if your test result is pending or your test results are positive.    If your test result is negative, you may return to work as long as all of the return to work criteria have been met.  (See below for [id number] criteria).  If you feel sick, regardless of test results, please stay home and contact your supervisor and follow Guidance for [facility] Workers located on the COVID-19 Information Center.    Off Work Start Date: 11/9/21    Team Member to do list:  Report your results by: 11/10/21    Self-Reporting Results Instructions:    • Safe Check   [email or web address]  • Hotline: [phone]    Return to Work Criteria (all criteria must be met):  • Negative test result  • Your temperature has remained less than 100.0F for at least 24 hours without using any fever reducing medications (ibuprofen, acetaminophen, etc.)  • Significant improvement in respiratory symptoms and cough  • No new or worsening symptoms    Symptoms of COVID-19 Infection   • Fever or Chills  • Cough  • Shortness of breath or difficulty breathing  • Fatigue  • Muscle or body aches  • Headache  • New loss of taste or smell  • Sore throat  • Congestion or runny nose   • Nausea or vomiting  • Diarrhea    [email or web address]  Hotline: [phone]  Hours (M-F: 7952-4848, Sat-Sun: 08-12). Please answer your phone if an outside line is calling. Regardless of time of day, we work 7 days a week and will follow up as appropriate.    CC:  Manager    [email or web address] Results Reporting:    • Team Members are required to include ALL necessary information in the email,  Specialty Pharmacy - Initial Clinical Assessment    Specialty Medication Orders Linked to Encounter      Flowsheet Row Most Recent Value   Medication #1 evolocumab (REPATHA SYRINGE) 140 mg/mL Syrg (Order#[id number], Rx#[id number])          Patient Diagnosis   E78.49 - Other hyperlipidemia    Subjective    Elijah Love is a 67 y.o. male, who is followed by the specialty pharmacy service for management and education.    Recent Encounters       Date Type Provider Description    10/12/2022 Specialty Pharmacy Adrienne Pena PharmD Initial Clinical Assessment    10/06/2022 Specialty Pharmacy Adrienne Pena PharmD Referral Authorization          Clinical call attempts since last clinical assessment   No call attempts found.     Current Outpatient Medications   Medication Sig    aspirin 81 MG Chew Take 1 tablet (81 mg total) by mouth once daily.    bempedoic acid 180 mg Tab Take 1 tablet (180 mg total) by mouth once daily.    cilostazoL (PLETAL) 50 MG Tab Take 1 tablet (50 mg total) by mouth 2 (two) times daily.    evolocumab (REPATHA SYRINGE) 140 mg/mL Syrg Inject 140 mg into the skin every 14 (fourteen) days.    gabapentin (NEURONTIN) 250 mg/5 mL solution 1/2 tsp po tid for pain or numbness if needed can increase to 1 tsp po qd prn numbness    nicotine (NICODERM CQ) 21 mg/24 hr Place 1 patch onto the skin once daily.   Last reviewed on 10/10/2022  4:56 PM by Jos Robins, CTTS    Review of patient's allergies indicates:  No Known AllergiesLast reviewed on  10/10/2022 4:56 PM by Jos Robins    Drug Interactions    Drug interactions evaluated: no  Clinically relevant drug interactions identified: no  Provided the patient with educational material regarding drug interactions: not applicable         Adverse Effects    *All other systems reviewed and are negative       Assessment Questions - Documented Responses      Flowsheet Row Most Recent Value   Assessment    Medication Reconciliation completed for  "patient No   During the past 4 weeks, has patient missed any activities due to condition or medication? No   During the past 4 weeks, did patient have any of the following urgent care visits? None   Goals of Therapy Status Discussed (new start)   Status of the patients ability to self-administer: Is Able   All education points have been covered with patient? No, patient declined- printed education provided   Welcome packet contents reviewed and discussed with patient? Yes   Assesment completed? Yes   Plan Therapy being initiated   Do you need to open a clinical intervention (i-vent)? No   Do you want to schedule first shipment? Yes          Refill Questions - Documented Responses      Flowsheet Row Most Recent Value   Patient Availability and HIPAA Verification    Does patient want to proceed with activity? Yes   HIPAA/medical authority confirmed? Yes   Relationship to patient of person spoken to? Self   Refill Screening Questions    When does the patient need to receive the medication? 10/13/22   Refill Delivery Questions    How will the patient receive the medication? MEDRx   When does the patient need to receive the medication? 10/13/22   Shipping Address Home   Address in [facility] confirmed and updated if neccessary? Yes   Expected Copay ($) 4   Is the patient able to afford the medication copay? Yes   Payment Method CC on file   Days supply of Refill 28   Supplies needed? No supplies needed   Refill activity completed? Yes   Refill activity plan Refill scheduled   Shipment/Pickup Date: 10/12/22            Objective    He has a past medical history of Kidney stones.    Tried/failed medications:     BP Readings from Last 4 Encounters:   09/28/22 (!) 140/82   09/16/22 129/73   09/03/22 117/70   09/02/22 (!) 150/76     Ht Readings from Last 4 Encounters:   09/28/22 5' 5" (1.651 m)   09/16/22 5' 5" (1.651 m)   09/03/22 5' 5" (1.651 m)   09/02/22 5' 5" (1.651 m)     Wt Readings from Last 4 Encounters: " or the results will not be accepted. By submitting an email with these results, you are attesting that the information submitted is accurate and that you are submitting your own test results.  • Information needed   ? Name   ?    ? Employee ID   ? Date Test was performed   ? Antigen or PCR   ? Reason for Covid Test: Symptoms or Exposure   ? Test Result   • Once the information is received to the Employee Health inbox, [employer] will enter your results into your EPIC chart, and they will display in your [employer] amish.       09/28/22 72 kg (158 lb 11.7 oz)   09/16/22 70 kg (154 lb 5.2 oz)   09/03/22 69.4 kg (153 lb)   09/02/22 72.1 kg (158 lb 15.2 oz)       The goals of prescribed drug therapy management include:  Supporting patient to meet the prescriber's medical treatment objectives  Improving or maintaining quality of life  Maintaining optimal therapy adherence  Minimizing and managing side effects      Goals of Therapy Status: Discussed (new start)    Assessment/Plan  Patient plans to start therapy on 10/13/22      Indication, dosage, appropriateness, effectiveness, safety and convenience of his specialty medication(s) were reviewed today.     Patient Education   Pharmacist offer to  patient was declined. Printed educational materials will be provided with medication.  Patient did accept verbal education on the following topics:  · Expectations and possible outcomes of therapy  · Proper use, timely administration, and missed dose management  · Duration of therapy  · Side effects, including prevention, minimization, and management  · Storage, safe handling, and disposal        Tasks added this encounter   11/3/2022 - Refill Call (Auto Added)   Tasks due within next 3 months   No tasks due.     Adrienne Pena, PharmD  [facility]  [address] [address]  Phone: [phone]  Fax: [phone]

## 2022-10-17 ENCOUNTER — CLINICAL SUPPORT (OUTPATIENT)
Dept: SMOKING CESSATION | Facility: CLINIC | Age: 67
End: 2022-10-17
Payer: COMMERCIAL

## 2022-10-17 DIAGNOSIS — F17.200 NICOTINE DEPENDENCE: Primary | ICD-10-CM

## 2022-10-17 PROCEDURE — 99403 PR PREVENT COUNSEL,INDIV,45 MIN: ICD-10-PCS | Mod: S$GLB,,,

## 2022-10-17 PROCEDURE — 99403 PREV MED CNSL INDIV APPRX 45: CPT | Mod: S$GLB,,,

## 2022-10-17 NOTE — Clinical Note
Pt seen in office today. He continues to smoke 14 cigs/day. Pt remains on tobacco cessation medication of 21 mg nicotine patch QD. No adverse effects/mental changes noted at this time. Pt asked to reduce current smoking rate by 2 cigs/day. Reviewed coping strategies/stress management/habitual behavior/relapse prevention with patient. Exhaled carbon monoxide level was 9 ppm per Smokerlyzer (0-6 non-smoker). Will see pt back in office in 1 wk.  CC

## 2022-10-24 ENCOUNTER — OFFICE VISIT (OUTPATIENT)
Dept: VASCULAR SURGERY | Facility: CLINIC | Age: 67
End: 2022-10-24
Attending: SURGERY
Payer: MEDICARE

## 2022-10-24 ENCOUNTER — CLINICAL SUPPORT (OUTPATIENT)
Dept: SMOKING CESSATION | Facility: CLINIC | Age: 67
End: 2022-10-24
Payer: COMMERCIAL

## 2022-10-24 ENCOUNTER — HOSPITAL ENCOUNTER (OUTPATIENT)
Dept: VASCULAR SURGERY | Facility: CLINIC | Age: 67
Discharge: HOME | End: 2022-10-24
Attending: SURGERY
Payer: MEDICARE

## 2022-10-24 VITALS
DIASTOLIC BLOOD PRESSURE: 72 MMHG | HEART RATE: 86 BPM | SYSTOLIC BLOOD PRESSURE: 121 MMHG | TEMPERATURE: 98 F | WEIGHT: 156.5 LBS | HEIGHT: 65 IN | BODY MASS INDEX: 26.08 KG/M2

## 2022-10-24 DIAGNOSIS — F17.200 NICOTINE DEPENDENCE: Primary | ICD-10-CM

## 2022-10-24 DIAGNOSIS — I73.9 PAD (PERIPHERAL ARTERY DISEASE): ICD-10-CM

## 2022-10-24 DIAGNOSIS — I70.212 ATHEROSCLEROSIS OF NATIVE ARTERIES OF EXTREMITIES WITH INTERMITTENT CLAUDICATION, LEFT LEG: Primary | ICD-10-CM

## 2022-10-24 DIAGNOSIS — F17.210 NICOTINE DEPENDENCE, CIGARETTES, UNCOMPLICATED: ICD-10-CM

## 2022-10-24 DIAGNOSIS — E78.5 HYPERLIPIDEMIA, UNSPECIFIED HYPERLIPIDEMIA TYPE: ICD-10-CM

## 2022-10-24 DIAGNOSIS — I73.9 PAD (PERIPHERAL ARTERY DISEASE): Primary | ICD-10-CM

## 2022-10-24 DIAGNOSIS — I70.212 ATHEROSCLEROSIS OF NATIVE ARTERIES OF EXTREMITIES WITH INTERMITTENT CLAUDICATION, LEFT LEG: ICD-10-CM

## 2022-10-24 PROCEDURE — 93923 UPR/LXTR ART STDY 3+ LVLS: CPT | Mod: 26,S$PBB,, | Performed by: SURGERY

## 2022-10-24 PROCEDURE — 93923 PR NON-INVASIVE PHYSIOLOGIC STUDY EXTREMITY 3 LEVELS: ICD-10-PCS | Mod: 26,S$PBB,, | Performed by: SURGERY

## 2022-10-24 PROCEDURE — 99999 PR PBB SHADOW E&M-EST. PATIENT-LVL III: CPT | Mod: PBBFAC,,, | Performed by: SURGERY

## 2022-10-24 PROCEDURE — 99403 PR PREVENT COUNSEL,INDIV,45 MIN: ICD-10-PCS | Mod: S$GLB,,,

## 2022-10-24 PROCEDURE — 99403 PREV MED CNSL INDIV APPRX 45: CPT | Mod: S$GLB,,,

## 2022-10-24 PROCEDURE — 99213 OFFICE O/P EST LOW 20 MIN: CPT | Mod: S$PBB,,, | Performed by: SURGERY

## 2022-10-24 PROCEDURE — 99213 OFFICE O/P EST LOW 20 MIN: CPT | Mod: PBBFAC,25 | Performed by: SURGERY

## 2022-10-24 PROCEDURE — 93923 UPR/LXTR ART STDY 3+ LVLS: CPT | Mod: PBBFAC | Performed by: SURGERY

## 2022-10-24 PROCEDURE — 99213 PR OFFICE/OUTPT VISIT, EST, LEVL III, 20-29 MIN: ICD-10-PCS | Mod: S$PBB,,, | Performed by: SURGERY

## 2022-10-24 PROCEDURE — 99999 PR PBB SHADOW E&M-EST. PATIENT-LVL III: ICD-10-PCS | Mod: PBBFAC,,, | Performed by: SURGERY

## 2022-10-24 RX ORDER — IBUPROFEN 200 MG
1 TABLET ORAL DAILY
Qty: 30 PATCH | Refills: 0 | Status: SHIPPED | OUTPATIENT
Start: 2022-10-24 | End: 2022-12-05 | Stop reason: SDUPTHER

## 2022-10-24 NOTE — PROGRESS NOTES
Individual Follow-Up Form    10/24/2022    Clinical Status of Patient: Outpatient    Length of Service: 45 minutes    Continuing Medication: yes  Patches    Other Medications: none     Target Symptoms: Withdrawal and medication side effects. The following were  rated moderate (3) to severe (4) on TCRS:  Moderate (3): desire/crave tobacco  Severe (4): none    Comments:  Pt seen in office today. He continues to smoke 13 cigs/day. Pt remains on tobacco cessation medication of 21 mg nicotine patch QD. No adverse effects/mental changes noted at this time. Pt asked to reduce current smoking rate by 3 cigs/day or try to make times between smokes longer (at least 60 min) Reviewed coping strategies/stress management/habitual behavior with patient. Exhaled carbon monoxide level was 13 ppm per Smokerlyzer (0-6 non-smoker). Will see pt back in office in 1 wk.      Diagnosis: F17.200    Next Visit: 1 week

## 2022-10-24 NOTE — Clinical Note
Pt seen in office today. He continues to smoke 13 cigs/day. Pt remains on tobacco cessation medication of 21 mg nicotine patch QD. No adverse effects/mental changes noted at this time. Pt asked to reduce current smoking rate by 3 cigs/day or try to make times between smokes longer (at least 60 min) Reviewed coping strategies/stress management/habitual behavior with patient. Exhaled carbon monoxide level was 13 ppm per Smokerlyzer (0-6 non-smoker). Will see pt back in office in 1 wk.

## 2022-10-24 NOTE — PROGRESS NOTES
VASCULAR SURGERY NOTE    Patient ID: Elijah Love is a 67 y.o. male.    I. HISTORY     Chief Complaint: LLE claudication    HPI: Elijah Love is a 67 y.o. male who is here today for follow up appointment  for lower extremity claudication. See previous history and HPI's below.  Today he returns and states that his claudication symptoms have continued to improve.  He was able to walk through [employer] and go shopping without stopping the other day.  He no longer has to take a scooter around the store.  He has been working hard to walk regularly and hydrate with at least 2 L of water per day.  He has also continued to take his cilostazol twice daily.  He has been able to cut down his smoking to 10 cigarettes per day which is significantly improved from 1.5ppd.  He continues with the [facility] smoking cessation program and has attended the classes regularly.  His goal is to quit smoking and he is wearing a patch to help him quit.  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 He realizes that he will have to change his behavior in order to cut out the remaining cigarettes and he will continue to work on this.  He was also able to see Dr. Mckeon and was prescribed Praluent to manage his hyperlipidemia since he is unable to swallow the statin tablets.  Thankfully he has been able to swallow the small cilostazol tablets.      HPI 9/16/22:  ...recent hospitalization on 9/3/22 for acute on chronic lower limb ischemia of his left leg. Prior to presentation at that time, he complained of claudication symptoms and was without any history of rest pain. He was treated with heparin gtt and fluid resuscitation in which he had improvement of symptoms. He is currently taking a chewable baby ASA daily. Due to inability to tolerate swallowing pills, he was not started on a statin.     Patient states he has been doing better since he was discharged. He complains of leg pain after walking about a block or so.  States he started having calf and shin pain after walking from the garage to the hospital entrance. The pain resolves with rest. The quality of the pain is crampy. The pain starts on the calf first. Since discharge, patient reports he has kept his hydration up and only drinks water instead of tea now. He also has cut back smoking from 1.5 pack/day to only about 5 cigarettes/day!!! I applauded him on his efforts and encouraged him to continue decreasing down to quitting.  States he is motivated to fully quit but is finding it hard to get through these last few cigarettes to completely quit.          Past Medical History:   Diagnosis Date    Kidney stones         Surgical History:  Tonsil/adenoidectomy      Social History     Occupational History    Not on file   Tobacco Use    Smoking status: Every Day    Smokeless tobacco: Never   Substance and Sexual Activity    Alcohol use: Yes    Drug use: Never    Sexual activity: Yes       Review of Systems   Constitutional: Negative for chills, decreased appetite, fever, malaise/fatigue and weight loss.   HENT:  Negative for congestion, ear pain, hoarse voice, nosebleeds and sore throat.    Eyes:  Negative for blurred vision, discharge, pain, photophobia and visual disturbance.   Cardiovascular:  Positive for claudication. Negative for chest pain, dyspnea on exertion, irregular heartbeat, leg swelling, palpitations and syncope.   Respiratory:  Negative for cough, shortness of breath and wheezing.    Endocrine: Negative for cold intolerance, heat intolerance and polyphagia.   Hematologic/Lymphatic: Negative for adenopathy. Does not bruise/bleed easily.   Skin:  Negative for itching and rash.   Musculoskeletal:  Positive for muscle cramps. Negative for back pain, joint swelling and muscle weakness.   Gastrointestinal:  Negative for abdominal pain, constipation, diarrhea, nausea and vomiting.   Genitourinary:  Negative for dysuria, flank pain, frequency and urgency.    Neurological:  Negative for focal weakness, headaches, numbness, seizures and weakness.       II. PHYSICAL EXAM     Physical Exam  Constitutional:       General: He is not in acute distress.  HENT:      Head: Normocephalic and atraumatic.      Mouth/Throat:      Mouth: Mucous membranes are moist.      Pharynx: Oropharynx is clear.   Eyes:      General: No scleral icterus.        Right eye: No discharge.         Left eye: No discharge.      Extraocular Movements: Extraocular movements intact.      Conjunctiva/sclera: Conjunctivae normal.   Cardiovascular:      Rate and Rhythm: Normal rate and regular rhythm.      Comments: Bilateral 2+ femoral pulses   L monophasic AT and PT  R biphasic DP and triphasic PT  Abdominal:      General: Abdomen is flat. There is no distension.      Palpations: Abdomen is soft.      Tenderness: There is no abdominal tenderness.   Musculoskeletal:      Cervical back: Normal range of motion and neck supple.      Comments: 5/5 bilateral LLE strength   Neurological:      General: No focal deficit present.      Mental Status: He is alert and oriented to person, place, and time.   Psychiatric:         Mood and Affect: Mood normal.         Behavior: Behavior normal.       III. ASSESSMENT & PLAN (MEDICAL DECISION MAKING)     1. Atherosclerosis of native arteries of extremities with intermittent claudication, left leg    2. PAD (peripheral artery disease)    3. Hyperlipidemia, unspecified hyperlipidemia type    4. Nicotine dependence, cigarettes, uncomplicated          Imaging Results: (I have personally reviewed the images/studies and provided my interpretation below)    U/S 9/2: significant stenosis in distal SFA, popliteal artery and PT/AT.    ALESSANDRA 10/24/22:  RLE: 1.13  LLE: 0.58  Findings suggest no evidence of PAD on the right.  There is moderate to severe PAD on the left.    Assessment/Diagnosis and Plan:  67 y.o. male with LLE claudication.  Symptoms are somewhat lifestyle limiting however  he still has room to improve his medical management and currently symptoms are mostly manageable. Explained importance of smoking abstinence to improve durability of any surgical treatment if necessary in the future as well as to reduce risk of worsening PAD, cerebrovascular disease, coronary disease, COPD etc. patient expressed understanding and was in agreement with the treatment plan.    -Continue Cilostazol 50mg BID   -Walking program at least 4x/week for 30min per session. Instructions provided  - Continue chewable ASA 81 mg daily. Recommended for all patients with PAD  -Continue to work on cutting smoking down until fully quit.   -Continue praluent  -Control of atherosclerotic risk factors: Goal LDL <100, Goal HbA1C <7, Goal BP <140/90  -Follow up in 2 months for re-evaluation of symptoms      ALEXIS Granado II, MD, VI  Vascular Surgeon  [facility]

## 2022-10-31 ENCOUNTER — CLINICAL SUPPORT (OUTPATIENT)
Dept: SMOKING CESSATION | Facility: CLINIC | Age: 67
End: 2022-10-31
Payer: COMMERCIAL

## 2022-10-31 DIAGNOSIS — F17.200 NICOTINE DEPENDENCE: Primary | ICD-10-CM

## 2022-10-31 PROCEDURE — 99403 PR PREVENT COUNSEL,INDIV,45 MIN: ICD-10-PCS | Mod: S$GLB,,,

## 2022-10-31 PROCEDURE — 99403 PREV MED CNSL INDIV APPRX 45: CPT | Mod: S$GLB,,,

## 2022-10-31 NOTE — PROGRESS NOTES
Individual Follow-Up Form    10/31/2022    Clinical Status of Patient: Outpatient    Length of Service: 45 minutes    Continuing Medication: yes  Patches    Other Medications: none     Target Symptoms: Withdrawal and medication side effects. The following were  rated moderate (3) to severe (4) on TCRS:  Moderate (3): desire/crave tobacco  Severe (4): none    Comments:  Pt seen in office today. He continues to smoke 10 cigs/day. Pt remains on tobacco cessation medication of 21 mg nicotine patch QD. He states he has been making time between smokes longer. He has noticed that he is smoking the cigarette down further. Explained that it is normal. No adverse effects/mental changes noted at this time. Pt asked to reduce current smoking rate by 2 cigs/day. Reviewed coping strategies/stress management/habitual behavior with patient. Exhaled carbon monoxide level was 13 ppm per Smokerlyzer (0-6 non-smoker). Will see pt back in office in 1 wk.      Diagnosis: F17.200    Next Visit: 1 week

## 2022-10-31 NOTE — Clinical Note
Pt seen in office today. He continues to smoke 10 cigs/day. Pt remains on tobacco cessation medication of 21 mg nicotine patch QD. He states he has been making time between smokes longer. He has noticed that he is smoking the cigarette down further. Explained that it is normal. No adverse effects/mental changes noted at this time. Pt asked to reduce current smoking rate by 2 cigs/day. Reviewed coping strategies/stress management/habitual behavior with patient. Exhaled carbon monoxide level was 13 ppm per Smokerlyzer (0-6 non-smoker). Will see pt back in office in 1 wk.

## 2022-11-03 ENCOUNTER — SPECIALTY PHARMACY (OUTPATIENT)
Dept: PHARMACY | Facility: CLINIC | Age: 67
End: 2022-11-03
Payer: MEDICARE

## 2022-11-03 NOTE — TELEPHONE ENCOUNTER
Specialty Pharmacy - Refill Coordination    Specialty Medication Orders Linked to Encounter      Flowsheet Row Most Recent Value   Medication #1 evolocumab (REPATHA SYRINGE) 140 mg/mL Syrg (Order#[id number], Rx#[id number])            Refill Questions - Documented Responses      Flowsheet Row Most Recent Value   Patient Availability and HIPAA Verification    Does patient want to proceed with activity? Yes   HIPAA/medical authority confirmed? Yes   Relationship to patient of person spoken to? Self   Refill Screening Questions    Would patient like to speak to a pharmacist? No   When does the patient need to receive the medication? 11/10/22   Refill Delivery Questions    How will the patient receive the medication? MEDRx   When does the patient need to receive the medication? 11/10/22   Shipping Address Home   Address in [facility] confirmed and updated if neccessary? Yes   Expected Copay ($) 4   Is the patient able to afford the medication copay? Yes   Payment Method CC on file   Days supply of Refill 28   Supplies needed? No supplies needed   Refill activity completed? Yes   Refill activity plan Refill scheduled   Shipment/Pickup Date: 11/09/22            Current Outpatient Medications   Medication Sig    aspirin 81 MG Chew Take 1 tablet (81 mg total) by mouth once daily.    bempedoic acid 180 mg Tab Take 1 tablet (180 mg total) by mouth once daily. (Patient not taking: Reported on 10/24/2022)    cilostazoL (PLETAL) 50 MG Tab Take 1 tablet (50 mg total) by mouth 2 (two) times daily.    evolocumab (REPATHA SYRINGE) 140 mg/mL Syrg Inject 140 mg into the skin every 14 (fourteen) days.    gabapentin (NEURONTIN) 250 mg/5 mL solution 1/2 tsp po tid for pain or numbness if needed can increase to 1 tsp po qd prn numbness (Patient not taking: Reported on 10/24/2022)    nicotine (NICODERM CQ) 21 mg/24 hr Place 1 patch onto the skin once daily.   Last reviewed on 10/31/2022  4:55 PM by Jos Robins CTTS    Review  of patient's allergies indicates:  No Known Allergies Last reviewed on  10/24/2022 5:03 PM by Jos Robins      Tasks added this encounter   12/1/2022 - Refill Call (Auto Added)   Tasks due within next 3 months   No tasks due.     Peggy Chavira, PharmD  [facility]  [address] [place] [id number]  Phone: [phone]  Fax: [phone]

## 2022-11-07 ENCOUNTER — CLINICAL SUPPORT (OUTPATIENT)
Dept: SMOKING CESSATION | Facility: CLINIC | Age: 67
End: 2022-11-07
Payer: COMMERCIAL

## 2022-11-07 DIAGNOSIS — F17.200 NICOTINE DEPENDENCE: Primary | ICD-10-CM

## 2022-11-07 PROCEDURE — 99403 PREV MED CNSL INDIV APPRX 45: CPT | Mod: S$GLB,,,

## 2022-11-07 PROCEDURE — 99403 PR PREVENT COUNSEL,INDIV,45 MIN: ICD-10-PCS | Mod: S$GLB,,,

## 2022-11-07 NOTE — Clinical Note
Pt seen in office today. He continues to smoke 6 cigs/day. Pt remains on tobacco cessation medication of 21 mg nicotine patch QD. Cravings are becoming less and he is getting a handle on the triggers that make him want to smoke. Time between smokes is getting longer. No adverse effects/mental changes noted at this time. Pt asked to reduce current smoking rate by 2 cigs/day. Reviewed coping strategies/stress management/habitual behavior/relapse prevention with patient. Exhaled carbon monoxide level was 8 ppm per Smokerlyzer (0-6 non-smoker). Will see pt back in office in 1 wk.

## 2022-11-07 NOTE — PROGRESS NOTES
Individual Follow-Up Form    11/7/2022    Clinical Status of Patient: Outpatient    Length of Service: 45 minutes    Continuing Medication: yes  Patches    Other Medications: none     Target Symptoms: Withdrawal and medication side effects. The following were rated moderate (3) to severe (4) on TCRS:  Moderate (3): desire/crave tobacco  Severe (4): none    Comments:  Pt seen in office today. He continues to smoke 6 cigs/day. Pt remains on tobacco cessation medication of 21 mg nicotine patch QD. Cravings are becoming less and he is getting a handle on the triggers that make him want to smoke. Time between smokes is getting longer. No adverse effects/mental changes noted at this time. Pt asked to reduce current smoking rate by 2 cigs/day. Reviewed coping strategies/stress management/habitual behavior/relapse prevention with patient. Exhaled carbon monoxide level was 8 ppm per Smokerlyzer (0-6 non-smoker). Will see pt back in office in 1 wk.      Diagnosis: F17.200    Next Visit: 1 week

## 2022-11-14 ENCOUNTER — CLINICAL SUPPORT (OUTPATIENT)
Dept: SMOKING CESSATION | Facility: CLINIC | Age: 67
End: 2022-11-14
Payer: COMMERCIAL

## 2022-11-14 DIAGNOSIS — F17.200 NICOTINE DEPENDENCE: Primary | ICD-10-CM

## 2022-11-14 PROCEDURE — 99403 PR PREVENT COUNSEL,INDIV,45 MIN: ICD-10-PCS | Mod: S$GLB,,,

## 2022-11-14 PROCEDURE — 99403 PREV MED CNSL INDIV APPRX 45: CPT | Mod: S$GLB,,,

## 2022-11-14 NOTE — PROGRESS NOTES
Individual Follow-Up Form    11/14/2022    Clinical Status of Patient: Outpatient    Length of Service: 45 minutes    Continuing Medication: yes  Patches    Other Medications: none     Target Symptoms: Withdrawal and medication side effects. The following were rated moderate (3) to severe (4) on TCRS:  Moderate (3): desire/crave tobacco  Severe (4): none    Comments:  Pt seen in office today. He continues to smoke 5-6 cigs/day. Pt remains on tobacco cessation medication of 21 mg nicotine patch QD. No adverse effects/mental changes noted at this time. He will start considering making this his last pack. If not asked him to see how long he can go w/o buying a new pack. Reviewed coping strategies/stress management/habitual behavior/relapse prevention with patient. Exhaled carbon monoxide level was 7 ppm per Smokerlyzer (0-6 non-smoker). Will see pt back in office in 1 wk.      Diagnosis: F17.200    Next Visit: 1 week

## 2022-11-14 NOTE — Clinical Note
Pt seen in office today. He continues to smoke 5-6 cigs/day. Pt remains on tobacco cessation medication of 21 mg nicotine patch QD. No adverse effects/mental changes noted at this time. He will start considering making this his last pack. If not asked him to see how long he can go w/o buying a new pack. Reviewed coping strategies/stress management/habitual behavior/relapse prevention with patient. Exhaled carbon monoxide level was 7 ppm per Smokerlyzer (0-6 non-smoker). Will see pt back in office in 1 wk.

## 2022-11-21 ENCOUNTER — CLINICAL SUPPORT (OUTPATIENT)
Dept: SMOKING CESSATION | Facility: CLINIC | Age: 67
End: 2022-11-21
Payer: COMMERCIAL

## 2022-11-21 DIAGNOSIS — F17.200 NICOTINE DEPENDENCE: Primary | ICD-10-CM

## 2022-11-21 PROCEDURE — 99403 PR PREVENT COUNSEL,INDIV,45 MIN: ICD-10-PCS | Mod: S$GLB,,,

## 2022-11-21 PROCEDURE — 99403 PREV MED CNSL INDIV APPRX 45: CPT | Mod: S$GLB,,,

## 2022-11-21 NOTE — PROGRESS NOTES
Individual Follow-Up Form    11/21/2022    Clinical Status of Patient: Outpatient    Length of Service: 45 minutes    Continuing Medication: yes  Patches    Other Medications: none     Target Symptoms: Withdrawal and medication side effects. The following were rated moderate (3) to severe (4) on TCRS:  Moderate (3): desire/crave tobacco  Severe (4): none    Comments:  Pt seen in office today. He continues to smoke 7 cigs/day. Pt remains on tobacco cessation medication of 21 mg nicotine patch QD. No adverse effects/mental changes noted at this time. Pt asked to reduce current smoking rate by 2 cigs/day. He states he has begun thinking about this being his last pack.  Reviewed coping strategies/stress management/habitual behavior/relapse prevention with patient. Exhaled carbon monoxide level was 5 ppm per Smokerlyzer (0-6 non-smoker). Will see pt back in office in 1 wk.      Diagnosis: F17.200    Next Visit: 1 week

## 2022-11-21 NOTE — Clinical Note
Pt seen in office today. He continues to smoke 7 cigs/day. Pt remains on tobacco cessation medication of 21 mg nicotine patch QD. No adverse effects/mental changes noted at this time. Pt asked to reduce current smoking rate by 2 cigs/day. He states he has begun thinking about this being his last pack.  Reviewed coping strategies/stress management/habitual behavior/relapse prevention with patient. Exhaled carbon monoxide level was 5 ppm per Smokerlyzer (0-6 non-smoker). Will see pt back in office in 1 wk

## 2022-12-01 ENCOUNTER — SPECIALTY PHARMACY (OUTPATIENT)
Dept: PHARMACY | Facility: CLINIC | Age: 67
End: 2022-12-01
Payer: MEDICARE

## 2022-12-01 NOTE — TELEPHONE ENCOUNTER
Specialty Pharmacy - Refill Coordination    Specialty Medication Orders Linked to Encounter      Flowsheet Row Most Recent Value   Medication #1 evolocumab (REPATHA SYRINGE) 140 mg/mL Syrg (Order#[id number], Rx#[id number])            Refill Questions - Documented Responses      Flowsheet Row Most Recent Value   Patient Availability and HIPAA Verification    Does patient want to proceed with activity? Yes   HIPAA/medical authority confirmed? Yes   Relationship to patient of person spoken to? Self   Refill Screening Questions    Would patient like to speak to a pharmacist? No   When does the patient need to receive the medication? 12/11/22   Refill Delivery Questions    How will the patient receive the medication? MEDRx   When does the patient need to receive the medication? 12/11/22   Shipping Address Home   Address in [facility] confirmed and updated if neccessary? Yes   Expected Copay ($) 4   Is the patient able to afford the medication copay? Yes   Payment Method CC on file   Days supply of Refill 28   Supplies needed? No supplies needed   Refill activity completed? Yes   Refill activity plan Refill scheduled   Shipment/Pickup Date: 12/08/22            Current Outpatient Medications   Medication Sig    aspirin 81 MG Chew Take 1 tablet (81 mg total) by mouth once daily.    bempedoic acid 180 mg Tab Take 1 tablet (180 mg total) by mouth once daily. (Patient not taking: Reported on 10/24/2022)    cilostazoL (PLETAL) 50 MG Tab Take 1 tablet (50 mg total) by mouth 2 (two) times daily.    evolocumab (REPATHA SYRINGE) 140 mg/mL Syrg Inject 140 mg into the skin every 14 (fourteen) days.    gabapentin (NEURONTIN) 250 mg/5 mL solution 1/2 tsp po tid for pain or numbness if needed can increase to 1 tsp po qd prn numbness (Patient not taking: Reported on 10/24/2022)    nicotine (NICODERM CQ) 21 mg/24 hr Place 1 patch onto the skin once daily.   Last reviewed on 11/21/2022  4:45 PM by Jos Robins CTTS    Review  of patient's allergies indicates:  No Known Allergies Last reviewed on  10/24/2022 5:03 PM by Jos Robins      Tasks added this encounter   1/1/2023 - Refill Call (Auto Added)   Tasks due within next 3 months   No tasks due.     [facility]  [address]  [facility] [address]  Phone: [phone]  Fax: [phone]

## 2022-12-05 ENCOUNTER — CLINICAL SUPPORT (OUTPATIENT)
Dept: SMOKING CESSATION | Facility: CLINIC | Age: 67
End: 2022-12-05
Payer: COMMERCIAL

## 2022-12-05 DIAGNOSIS — F17.200 NICOTINE DEPENDENCE: Primary | ICD-10-CM

## 2022-12-05 PROCEDURE — 99403 PR PREVENT COUNSEL,INDIV,45 MIN: ICD-10-PCS | Mod: S$GLB,,,

## 2022-12-05 PROCEDURE — 99403 PREV MED CNSL INDIV APPRX 45: CPT | Mod: S$GLB,,,

## 2022-12-05 RX ORDER — IBUPROFEN 200 MG
1 TABLET ORAL DAILY
Qty: 30 PATCH | Refills: 0 | Status: SHIPPED | OUTPATIENT
Start: 2022-12-05 | End: 2023-01-09 | Stop reason: SDUPTHER

## 2022-12-05 NOTE — PROGRESS NOTES
Individual Follow-Up Form    12/5/2022    Clinical Status of Patient: Outpatient    Length of Service: 45 minutes    Continuing Medication: yes  Patches    Other Medications: none     Target Symptoms: Withdrawal and medication side effects. The following were rated moderate (3) to severe (4) on TCRS:  Moderate (3): desire/crave tobacco  Severe (4): none    Comments:  Pt seen in office today. He continues to smoke 8 cigs/day. Pt remains on tobacco cessation medication of 21 mg nicotine patches QD. No adverse effects/mental changes noted at this time. He states he will make this his last pack. Reviewed coping strategies/stress management/habitual behavior/relapse prevention with patient. Exhaled carbon monoxide level was 7 ppm per Smokerlyzer (0-6 non-smoker). Will see pt back in office in 1 wk.      Diagnosis: F17.200    Next Visit: 1 week

## 2022-12-06 ENCOUNTER — PATIENT MESSAGE (OUTPATIENT)
Dept: ADMINISTRATIVE | Facility: HOSPITAL | Age: 67
End: 2022-12-06
Payer: MEDICARE

## 2022-12-06 DIAGNOSIS — Z12.11 SCREENING FOR COLON CANCER: ICD-10-CM

## 2022-12-12 ENCOUNTER — CLINICAL SUPPORT (OUTPATIENT)
Dept: SMOKING CESSATION | Facility: CLINIC | Age: 67
End: 2022-12-12
Payer: COMMERCIAL

## 2022-12-12 DIAGNOSIS — F17.200 NICOTINE DEPENDENCE: Primary | ICD-10-CM

## 2022-12-12 PROCEDURE — 99403 PREV MED CNSL INDIV APPRX 45: CPT | Mod: S$GLB,,,

## 2022-12-12 PROCEDURE — 99403 PR PREVENT COUNSEL,INDIV,45 MIN: ICD-10-PCS | Mod: S$GLB,,,

## 2022-12-12 NOTE — Clinical Note
Pt seen in office today. He continues to smoke 3-4 cigs/day. Pt remains on tobacco cessation medication of 21 mg nicotine patch QD. No adverse effects/mental changes noted at this time. Pt asked to make this last pack. Reviewed coping strategies/stress management/habitual behavior/relapse prevention with patient. Exhaled carbon monoxide level was 4 ppm per Smokerlyzer (0-6 non-smoker). Will see pt back in office in 1 wk.

## 2022-12-12 NOTE — PROGRESS NOTES
Individual Follow-Up Form    12/12/2022    Clinical Status of Patient: Outpatient    Length of Service: 45 minutes    Continuing Medication: yes  Patches    Other Medications: none     Target Symptoms: Withdrawal and medication side effects. The following were rated moderate (3) to severe (4) on TCRS:  Moderate (3): desire/crave tobacco  Severe (4): none    Comments:  Pt seen in office today. He continues to smoke 3-4 cigs/day. Pt remains on tobacco cessation medication of 21 mg nicotine patch QD. No adverse effects/mental changes noted at this time. Pt asked to make this last pack. Reviewed coping strategies/stress management/habitual behavior/relapse prevention with patient. Exhaled carbon monoxide level was 4 ppm per Smokerlyzer (0-6 non-smoker). Will see pt back in office in 1 wk.      Diagnosis: F17.200    Next Visit: 1 week

## 2022-12-19 ENCOUNTER — CLINICAL SUPPORT (OUTPATIENT)
Dept: SMOKING CESSATION | Facility: CLINIC | Age: 67
End: 2022-12-19
Payer: COMMERCIAL

## 2022-12-19 ENCOUNTER — OFFICE VISIT (OUTPATIENT)
Dept: VASCULAR SURGERY | Facility: CLINIC | Age: 67
End: 2022-12-19
Attending: SURGERY
Payer: MEDICARE

## 2022-12-19 VITALS
HEART RATE: 92 BPM | TEMPERATURE: 98 F | WEIGHT: 160.94 LBS | BODY MASS INDEX: 26.81 KG/M2 | DIASTOLIC BLOOD PRESSURE: 83 MMHG | SYSTOLIC BLOOD PRESSURE: 138 MMHG | HEIGHT: 65 IN

## 2022-12-19 DIAGNOSIS — E78.5 HYPERLIPIDEMIA, UNSPECIFIED HYPERLIPIDEMIA TYPE: ICD-10-CM

## 2022-12-19 DIAGNOSIS — I70.212 ATHEROSCLEROSIS OF NATIVE ARTERIES OF EXTREMITIES WITH INTERMITTENT CLAUDICATION, LEFT LEG: Primary | ICD-10-CM

## 2022-12-19 DIAGNOSIS — F17.200 NICOTINE DEPENDENCE: Primary | ICD-10-CM

## 2022-12-19 DIAGNOSIS — I73.9 PAD (PERIPHERAL ARTERY DISEASE): ICD-10-CM

## 2022-12-19 PROCEDURE — 99213 OFFICE O/P EST LOW 20 MIN: CPT | Mod: PBBFAC | Performed by: SURGERY

## 2022-12-19 PROCEDURE — 99999 PR PBB SHADOW E&M-EST. PATIENT-LVL III: ICD-10-PCS | Mod: PBBFAC,,, | Performed by: SURGERY

## 2022-12-19 PROCEDURE — 99212 OFFICE O/P EST SF 10 MIN: CPT | Mod: S$PBB,,, | Performed by: SURGERY

## 2022-12-19 PROCEDURE — 99212 PR OFFICE/OUTPT VISIT, EST, LEVL II, 10-19 MIN: ICD-10-PCS | Mod: S$PBB,,, | Performed by: SURGERY

## 2022-12-19 PROCEDURE — 99403 PR PREVENT COUNSEL,INDIV,45 MIN: ICD-10-PCS | Mod: S$GLB,,,

## 2022-12-19 PROCEDURE — 99403 PREV MED CNSL INDIV APPRX 45: CPT | Mod: S$GLB,,,

## 2022-12-19 PROCEDURE — 99999 PR PBB SHADOW E&M-EST. PATIENT-LVL III: CPT | Mod: PBBFAC,,, | Performed by: SURGERY

## 2022-12-19 NOTE — Clinical Note
Pt seen in office today. He continues to smoke 1 cigs/day. Pt remains on tobacco cessation medication of 21 mg nicotine patch QD. No adverse effects/mental changes noted at this time. Reviewed coping strategies/stress management/habitual behavior/relapse prevention with patient. Exhaled carbon monoxide level was 2 ppm per Smokerlyzer (0-6 non-smoker). Will see pt back in office in 2 wks due to holidays.

## 2022-12-19 NOTE — PROGRESS NOTES
Individual Follow-Up Form    12/19/2022    Clinical Status of Patient: Outpatient    Length of Service: 45 minutes    Continuing Medication: yes  Patches    Other Medications: none     Target Symptoms: Withdrawal and medication side effects. The following were  rated moderate (3) to severe (4) on TCRS:  Moderate (3): desire/crave tobacco  Severe (4): none    Comments:  Pt seen in office today. He continues to smoke 1 cigs/day. Pt remains on tobacco cessation medication of 21 mg nicotine patch QD. No adverse effects/mental changes noted at this time. Reviewed coping strategies/stress management/habitual behavior/relapse prevention with patient. Exhaled carbon monoxide level was 2 ppm per Smokerlyzer (0-6 non-smoker). Will see pt back in office in 1 wk.      Diagnosis: F17.200    Next Visit: 1 week

## 2022-12-19 NOTE — PROGRESS NOTES
VASCULAR SURGERY NOTE    Patient ID: Elijah Love is a 67 y.o. male.    I. HISTORY     Chief Complaint: LLE claudication    HPI: Elijah Love is a 67 y.o. male who is here today for follow up appointment  for lower extremity claudication.  Patient reports that he is feeling well and that his claudication symptoms are continuing to improve. He notes that he is able to walk farther before his experiences leg pain. He was able to cut his grass for the first time in 3 months. He is continuing to drink 2L daily. He is now down to 1 cigarette daily and is motivated to quit completely. Is still attending [facility] smoking cessation program and using the patch.      See previous history and HPI's below.      HPI 10/24/22: Today he returns and states that his claudication symptoms have continued to improve.  He was able to walk through [employer] and go shopping without stopping the other day.  He no longer has to take a scooter around the store.  He has been working hard to walk regularly and hydrate with at least 2 L of water per day.  He has also continued to take his cilostazol twice daily.  He has been able to cut down his smoking to 10 cigarettes per day which is significantly improved from 1.5ppd.  He continues with the [facility] smoking cessation program and has attended the classes regularly.  His goal is to quit smoking and he is wearing a patch to help him quit.  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 He realizes that he will have to change his behavior in order to cut out the remaining cigarettes and he will continue to work on this.  He was also able to see Dr. Mckeon and was prescribed Praluent to manage his hyperlipidemia since he is unable to swallow the statin tablets.  Thankfully he has been able to swallow the small cilostazol tablets.      HPI 9/16/22:  ...recent hospitalization on 9/3/22 for acute on chronic lower limb ischemia of his left leg. Prior to presentation at that  time, he complained of claudication symptoms and was without any history of rest pain. He was treated with heparin gtt and fluid resuscitation in which he had improvement of symptoms. He is currently taking a chewable baby ASA daily. Due to inability to tolerate swallowing pills, he was not started on a statin.     Patient states he has been doing better since he was discharged. He complains of leg pain after walking about a block or so. States he started having calf and shin pain after walking from the garage to the hospital entrance. The pain resolves with rest. The quality of the pain is crampy. The pain starts on the calf first. Since discharge, patient reports he has kept his hydration up and only drinks water instead of tea now. He also has cut back smoking from 1.5 pack/day to only about 5 cigarettes/day!!! I applauded him on his efforts and encouraged him to continue decreasing down to quitting.  States he is motivated to fully quit but is finding it hard to get through these last few cigarettes to completely quit.          Past Medical History:   Diagnosis Date    Kidney stones         Surgical History:  Tonsil/adenoidectomy      Social History     Occupational History    Not on file   Tobacco Use    Smoking status: Every Day    Smokeless tobacco: Never   Substance and Sexual Activity    Alcohol use: Yes    Drug use: Never    Sexual activity: Yes       Review of Systems   Constitutional: Negative for chills, decreased appetite, fever, malaise/fatigue and weight loss.   HENT:  Negative for congestion, ear pain, hoarse voice, nosebleeds and sore throat.    Eyes:  Negative for blurred vision, discharge, pain, photophobia and visual disturbance.   Cardiovascular:  Positive for claudication. Negative for chest pain, dyspnea on exertion, irregular heartbeat, leg swelling, palpitations and syncope.   Respiratory:  Negative for cough, shortness of breath and wheezing.    Endocrine: Negative for cold intolerance,  heat intolerance and polyphagia.   Hematologic/Lymphatic: Negative for adenopathy. Does not bruise/bleed easily.   Skin:  Negative for itching and rash.   Musculoskeletal:  Positive for muscle cramps. Negative for back pain, joint swelling and muscle weakness.   Gastrointestinal:  Negative for abdominal pain, constipation, diarrhea, nausea and vomiting.   Genitourinary:  Negative for dysuria, flank pain, frequency and urgency.   Neurological:  Negative for focal weakness, headaches, numbness, seizures and weakness.       II. PHYSICAL EXAM     Physical Exam  Constitutional:       General: He is not in acute distress.  HENT:      Head: Normocephalic and atraumatic.      Mouth/Throat:      Mouth: Mucous membranes are moist.      Pharynx: Oropharynx is clear.   Eyes:      General: No scleral icterus.        Right eye: No discharge.         Left eye: No discharge.      Extraocular Movements: Extraocular movements intact.      Conjunctiva/sclera: Conjunctivae normal.   Cardiovascular:      Rate and Rhythm: Normal rate and regular rhythm.      Comments: Bilateral 2+ femoral pulses   L monophasic AT and PT  R biphasic DP and triphasic PT  Abdominal:      General: Abdomen is flat. There is no distension.      Palpations: Abdomen is soft.      Tenderness: There is no abdominal tenderness.   Musculoskeletal:      Cervical back: Normal range of motion and neck supple.      Comments: 5/5 bilateral LLE strength   Neurological:      General: No focal deficit present.      Mental Status: He is alert and oriented to person, place, and time.   Psychiatric:         Mood and Affect: Mood normal.         Behavior: Behavior normal.       III. ASSESSMENT & PLAN (MEDICAL DECISION MAKING)     1. Atherosclerosis of native arteries of extremities with intermittent claudication, left leg    2. PAD (peripheral artery disease)    3. Hyperlipidemia, unspecified hyperlipidemia type            Imaging Results: (I have personally reviewed the  images/studies and provided my interpretation below)    U/S 9/2: significant stenosis in distal SFA, popliteal artery and PT/AT.    ALESSANDRA 10/24/22:  RLE: 1.13  LLE: 0.58  Findings suggest no evidence of PAD on the right.  There is moderate to severe PAD on the left.    Assessment/Diagnosis and Plan:  67 y.o. male with LLE claudication.  Symptoms are occasionally lifestyle limiting however he still has room to improve his medical management and currently symptoms are manageable. Explained importance of smoking abstinence to improve durability of any surgical treatment if necessary in the future as well as to reduce risk of worsening PAD, cerebrovascular disease, coronary disease, COPD etc. patient expressed understanding and was in agreement with the treatment plan.    -Continue Cilostazol 50mg BID   -Walking program at least 4x/week for 30min per session. Instructions provided  -Continue chewable ASA 81 mg daily. Recommended for all patients with PAD  -Continue to work on cutting smoking down until fully quit.   -Continue praluent  -Control of atherosclerotic risk factors: Goal LDL <100, Goal HbA1C <7, Goal BP <140/90  -Follow up in March 2023 for re-evaluation of symptoms      ALEXIS Granado II, MD, RPVI  Vascular Surgeon  [facility]

## 2023-01-03 ENCOUNTER — SPECIALTY PHARMACY (OUTPATIENT)
Dept: PHARMACY | Facility: CLINIC | Age: 68
End: 2023-01-03
Payer: MEDICARE

## 2023-01-03 NOTE — TELEPHONE ENCOUNTER
Specialty Pharmacy - Refill Coordination    Specialty Medication Orders Linked to Encounter      Flowsheet Row Most Recent Value   Medication #1 evolocumab (REPATHA SYRINGE) 140 mg/mL Syrg (Order#[id number], Rx#[id number])            Refill Questions - Documented Responses      Flowsheet Row Most Recent Value   Patient Availability and HIPAA Verification    Does patient want to proceed with activity? Yes   HIPAA/medical authority confirmed? Yes   Relationship to patient of person spoken to? Self   Refill Screening Questions    Would patient like to speak to a pharmacist? No   When does the patient need to receive the medication? 01/08/23   Refill Delivery Questions    How will the patient receive the medication? MEDRx   When does the patient need to receive the medication? 01/08/23   Shipping Address Home   Address in [facility] confirmed and updated if neccessary? Yes   Expected Copay ($) 4.3   Is the patient able to afford the medication copay? Yes   Payment Method CC on file   Days supply of Refill 28   Supplies needed? No supplies needed   Refill activity completed? Yes   Refill activity plan Refill scheduled   Shipment/Pickup Date: 01/04/23            Current Outpatient Medications   Medication Sig    aspirin 81 MG Chew Take 1 tablet (81 mg total) by mouth once daily.    bempedoic acid 180 mg Tab Take 1 tablet (180 mg total) by mouth once daily.    cilostazoL (PLETAL) 50 MG Tab Take 1 tablet (50 mg total) by mouth 2 (two) times daily.    evolocumab (REPATHA SYRINGE) 140 mg/mL Syrg Inject 140 mg into the skin every 14 (fourteen) days.    gabapentin (NEURONTIN) 250 mg/5 mL solution 1/2 tsp po tid for pain or numbness if needed can increase to 1 tsp po qd prn numbness    nicotine (NICODERM CQ) 21 mg/24 hr Place 1 patch onto the skin once daily.   Last reviewed on 12/19/2022 11:10 AM by JOHANNA Paul    Review of patient's allergies indicates:  No Known Allergies Last reviewed on  12/19/2022 11:10  AM by Jos Robins      Tasks added this encounter   No tasks added.   Tasks due within next 3 months   1/1/2023 - Refill Call (Auto Added)     Eran Funes, PharmD  [facility]  [address] [place], [place] [address]  [facility] [address]  Phone: [phone]  Fax: [phone]

## 2023-01-05 ENCOUNTER — CLINICAL SUPPORT (OUTPATIENT)
Dept: SMOKING CESSATION | Facility: CLINIC | Age: 68
End: 2023-01-05

## 2023-01-05 DIAGNOSIS — F17.200 NICOTINE DEPENDENCE: Primary | ICD-10-CM

## 2023-01-05 PROCEDURE — 99407 PR TOBACCO USE CESSATION INTENSIVE >10 MINUTES: ICD-10-PCS | Mod: S$GLB,,,

## 2023-01-05 PROCEDURE — 99407 BEHAV CHNG SMOKING > 10 MIN: CPT | Mod: S$GLB,,,

## 2023-01-05 NOTE — PROGRESS NOTES
Called pt to f/u on his 3 month smoking cessation quit status. Pt stated he is still smoking, but down to 2 cpd and is still actively enrolled in program. Informed him of benefit period, phone follow ups, and contact information. Will complete smart form and will continue to follow up on quit #1 episode.

## 2023-01-09 ENCOUNTER — CLINICAL SUPPORT (OUTPATIENT)
Dept: SMOKING CESSATION | Facility: CLINIC | Age: 68
End: 2023-01-09
Payer: COMMERCIAL

## 2023-01-09 DIAGNOSIS — F17.200 NICOTINE DEPENDENCE: Primary | ICD-10-CM

## 2023-01-09 PROCEDURE — 99403 PR PREVENT COUNSEL,INDIV,45 MIN: ICD-10-PCS | Mod: S$GLB,,,

## 2023-01-09 PROCEDURE — 99403 PREV MED CNSL INDIV APPRX 45: CPT | Mod: S$GLB,,,

## 2023-01-09 RX ORDER — IBUPROFEN 200 MG
1 TABLET ORAL DAILY
Qty: 30 PATCH | Refills: 0 | Status: SHIPPED | OUTPATIENT
Start: 2023-01-09 | End: 2023-02-06

## 2023-01-09 RX ORDER — ASPIRIN/CALCIUM CARB/MAGNESIUM 325 MG
4 TABLET ORAL
Qty: 72 LOZENGE | Refills: 0 | Status: SHIPPED | OUTPATIENT
Start: 2023-01-09

## 2023-01-09 NOTE — PROGRESS NOTES
Individual Follow-Up Form    1/9/2023    Clinical Status of Patient: Outpatient    Length of Service: 45 minutes    Continuing Medication: yes  Patches    Other Medications: none     Target Symptoms: Withdrawal and medication side effects. The following were rated moderate (3) to severe (4) on TCRS:  Moderate (3): desire/crave tobacco  Severe (4): none    Comments:  Pt seen in office today. He continues to smoke 2 cigs/day. Pt remains on tobacco cessation medication of 21 mg nicotine patch QD. Added 4 mg nicotine to help with cravings. No adverse effects/mental changes noted at this time. Reviewed coping strategies/stress management/habitual behavior/relapse prevention with patient. Exhaled carbon monoxide level was 9 ppm per Smokerlyzer (0-6 non-smoker). Will see pt back in office in 1 wk.      Diagnosis: F17.200    Next Visit: 1 week

## 2023-01-09 NOTE — Clinical Note
Pt seen in office today. He continues to smoke 2 cigs/day. Pt remains on tobacco cessation medication of 21 mg nicotine patch QD. Added 4 mg nicotine to help with cravings. No adverse effects/mental changes noted at this time. Reviewed coping strategies/stress management/habitual behavior/relapse prevention with patient. Exhaled carbon monoxide level was 9 ppm per Smokerlyzer (0-6 non-smoker). Will see pt back in office in 1 wk.

## 2023-01-12 ENCOUNTER — PATIENT OUTREACH (OUTPATIENT)
Dept: ADMINISTRATIVE | Facility: HOSPITAL | Age: 68
End: 2023-01-12
Payer: MEDICARE

## 2023-01-12 NOTE — PROGRESS NOTES
Health Maintenance Due   Topic Date Due    COVID-19 Vaccine (1) Never done    Colorectal Cancer Screening  Never done    Shingles Vaccine (1 of 2) Never done    Abdominal Aortic Aneurysm Screening  Never done    Pneumococcal Vaccines (Age 65+) (2 - PCV) 02/01/2022    Influenza Vaccine (1) 09/01/2022     Chart review done.   HM updated.   Immunizations reviewed & updated.   Care Everywhere updated.  Orders Entered:  NA

## 2023-01-13 ENCOUNTER — PATIENT OUTREACH (OUTPATIENT)
Dept: ADMINISTRATIVE | Facility: HOSPITAL | Age: 68
End: 2023-01-13
Payer: MEDICARE

## 2023-01-26 ENCOUNTER — TELEPHONE (OUTPATIENT)
Dept: PRIMARY CARE CLINIC | Facility: CLINIC | Age: 68
End: 2023-01-26
Payer: MEDICARE

## 2023-01-26 NOTE — TELEPHONE ENCOUNTER
----- Message from Johan Ryan sent at 1/26/2023  8:18 AM CST -----  Contact: Pt [phone]  Same day appmnt    The patient has a stomach ache (level 7) and nausea and wants to know if you or a colleague can see him today.    Thank you

## 2023-01-30 ENCOUNTER — SPECIALTY PHARMACY (OUTPATIENT)
Dept: PHARMACY | Facility: CLINIC | Age: 68
End: 2023-01-30
Payer: MEDICARE

## 2023-01-30 ENCOUNTER — OFFICE VISIT (OUTPATIENT)
Dept: PRIMARY CARE CLINIC | Facility: CLINIC | Age: 68
End: 2023-01-30
Payer: MEDICARE

## 2023-01-30 VITALS
OXYGEN SATURATION: 96 % | HEART RATE: 96 BPM | RESPIRATION RATE: 18 BRPM | BODY MASS INDEX: 25.29 KG/M2 | DIASTOLIC BLOOD PRESSURE: 68 MMHG | HEIGHT: 65 IN | SYSTOLIC BLOOD PRESSURE: 112 MMHG | WEIGHT: 151.81 LBS | TEMPERATURE: 98 F

## 2023-01-30 DIAGNOSIS — Z09 HOSPITAL DISCHARGE FOLLOW-UP: Primary | ICD-10-CM

## 2023-01-30 DIAGNOSIS — K57.92 DIVERTICULITIS: ICD-10-CM

## 2023-01-30 PROCEDURE — 99999 PR PBB SHADOW E&M-EST. PATIENT-LVL IV: ICD-10-PCS | Mod: PBBFAC,,, | Performed by: NURSE PRACTITIONER

## 2023-01-30 PROCEDURE — 99999 PR PBB SHADOW E&M-EST. PATIENT-LVL IV: CPT | Mod: PBBFAC,,, | Performed by: NURSE PRACTITIONER

## 2023-01-30 PROCEDURE — 99214 PR OFFICE/OUTPT VISIT, EST, LEVL IV, 30-39 MIN: ICD-10-PCS | Mod: S$PBB,,, | Performed by: NURSE PRACTITIONER

## 2023-01-30 PROCEDURE — 99214 OFFICE O/P EST MOD 30 MIN: CPT | Mod: PBBFAC,PN | Performed by: NURSE PRACTITIONER

## 2023-01-30 PROCEDURE — 99214 OFFICE O/P EST MOD 30 MIN: CPT | Mod: S$PBB,,, | Performed by: NURSE PRACTITIONER

## 2023-01-30 RX ORDER — ZOSTER VACCINE RECOMBINANT, ADJUVANTED 50 MCG/0.5
0.5 KIT INTRAMUSCULAR ONCE
Qty: 1 EACH | Refills: 0 | Status: SHIPPED | OUTPATIENT
Start: 2023-01-30 | End: 2023-01-30

## 2023-01-30 RX ORDER — DICYCLOMINE HYDROCHLORIDE 10 MG/1
10 CAPSULE ORAL 3 TIMES DAILY
COMMUNITY
Start: 2023-01-26 | End: 2023-03-21

## 2023-01-30 NOTE — TELEPHONE ENCOUNTER
Specialty Pharmacy - Refill Coordination    Specialty Medication Orders Linked to Encounter      Flowsheet Row Most Recent Value   Medication #1 evolocumab (REPATHA SYRINGE) 140 mg/mL Syrg (Order#[id number], Rx#[id number])            Refill Questions - Documented Responses      Flowsheet Row Most Recent Value   Patient Availability and HIPAA Verification    Does patient want to proceed with activity? Yes   HIPAA/medical authority confirmed? Yes   Relationship to patient of person spoken to? Self   Refill Screening Questions    Would patient like to speak to a pharmacist? No   When does the patient need to receive the medication? 02/05/23   Refill Delivery Questions    How will the patient receive the medication? MEDRx   When does the patient need to receive the medication? 02/05/23   Shipping Address Home   Address in [facility] confirmed and updated if neccessary? Yes   Expected Copay ($) 4.3   Is the patient able to afford the medication copay? Yes   Payment Method CC on file   Days supply of Refill 28   Supplies needed? No supplies needed   Refill activity completed? Yes   Refill activity plan Refill scheduled   Shipment/Pickup Date: 01/31/23            Current Outpatient Medications   Medication Sig    aspirin 81 MG Chew Take 1 tablet (81 mg total) by mouth once daily.    bempedoic acid 180 mg Tab Take 1 tablet (180 mg total) by mouth once daily.    cilostazoL (PLETAL) 50 MG Tab Take 1 tablet (50 mg total) by mouth 2 (two) times daily.    ciprofloxacin HCl (CIPRO) 500 MG tablet Take 1 tablet (500 mg total) by mouth 2 (two) times daily. for 10 days    evolocumab (REPATHA SYRINGE) 140 mg/mL Syrg Inject 140 mg into the skin every 14 (fourteen) days.    gabapentin (NEURONTIN) 250 mg/5 mL solution 1/2 tsp po tid for pain or numbness if needed can increase to 1 tsp po qd prn numbness    HYDROcodone-acetaminophen (NORCO) 5-325 mg per tablet Take 1 tablet by mouth every 8 (eight) hours as needed for Pain.     metroNIDAZOLE (FLAGYL) 500 MG tablet Take 1 tablet (500 mg total) by mouth 3 (three) times daily. for 10 days    nicotine (NICODERM CQ) 21 mg/24 hr Place 1 patch onto the skin once daily.    nicotine polacrilex 4 MG Lozg Take 1 lozenge (4 mg total) by mouth as needed (1-2 per hour in place of cigarettes).    ondansetron (ZOFRAN-ODT) 4 MG TbDL Take 1 tablet (4 mg total) by mouth 3 (three) times daily.   Last reviewed on 1/27/2023  5:06 AM by Ann-Marie Bartlett, RN    Review of patient's allergies indicates:  No Known Allergies Last reviewed on  1/27/2023 5:05 AM by Ann-Marie Bartlett      Tasks added this encounter   2/26/2023 - Refill Call (Auto Added)   Tasks due within next 3 months   No tasks due.     Adrienne Pena, PharmD  [facility]  [address] [place] [address]  Phone: [phone]  Fax: [phone]

## 2023-01-30 NOTE — PROGRESS NOTES
Chief Complaint  Chief Complaint   Patient presents with    Diverticulitis       HPI    Patient recently seen in the emergency room for fever, left lower quadrant pain diagnosed with diverticulitis and treated with metronidazole and Cipro for 10 days.  Unable to tolerate the medication as patient is not able to swallow any pills.  Back to urgent care yesterday was treated with amoxicillin liquid but he has not started at this point.  Continues with left lower quadrant pain, diarrhea.  Abdominal pain described as moderate, 5/10.  Intermittent.  No alleviating or exacerbating factors.  Diarrhea is nonbloody.  Brown.  No nausea or vomiting.  No continued fever.  Patient without history of diverticulitis in the past.  No previous colonoscopy.        PAST MEDICAL HISTORY:  Past Medical History:   Diagnosis Date    Kidney stones     Peripheral vascular disease, unspecified        PAST SURGICAL HISTORY:  History reviewed. No pertinent surgical history.    SOCIAL HISTORY:  Social History     Socioeconomic History    Marital status: Single   Tobacco Use    Smoking status: Every Day     Packs/day: 0.02     Types: Cigarettes    Smokeless tobacco: Never   Substance and Sexual Activity    Alcohol use: Yes    Drug use: Never    Sexual activity: Yes       FAMILY HISTORY:  History reviewed. No pertinent family history.    ALLERGIES AND MEDICATIONS: updated and reviewed.  Review of patient's allergies indicates:  No Known Allergies  Current Outpatient Medications   Medication Sig Dispense Refill    aspirin 81 MG Chew Take 1 tablet (81 mg total) by mouth once daily. 360 tablet 0    bempedoic acid 180 mg Tab Take 1 tablet (180 mg total) by mouth once daily. 30 tablet 11    cilostazoL (PLETAL) 50 MG Tab Take 1 tablet (50 mg total) by mouth 2 (two) times daily. 60 tablet 11    dicyclomine (BENTYL) 10 MG capsule Take 10 mg by mouth 3 (three) times daily.      evolocumab (REPATHA SYRINGE) 140 mg/mL Syrg Inject 140 mg into the skin every  "14 (fourteen) days. 10 mL 10    nicotine (NICODERM CQ) 21 mg/24 hr Place 1 patch onto the skin once daily. 30 patch 0    nicotine polacrilex 4 MG Lozg Take 1 lozenge (4 mg total) by mouth as needed (1-2 per hour in place of cigarettes). 72 lozenge 0    ondansetron (ZOFRAN-ODT) 4 MG TbDL Take 1 tablet (4 mg total) by mouth 3 (three) times daily. 10 tablet 0    ciprofloxacin HCl (CIPRO) 500 MG tablet Take 1 tablet (500 mg total) by mouth 2 (two) times daily. for 10 days (Patient not taking: Reported on 1/30/2023) 20 tablet 0    metroNIDAZOLE (FLAGYL) 500 MG tablet Take 1 tablet (500 mg total) by mouth 3 (three) times daily. for 10 days (Patient not taking: Reported on 1/30/2023) 30 tablet 0    varicella-zoster gE-AS01B, PF, (SHINGRIX, PF,) 50 mcg/0.5 mL injection Inject 0.5 mLs into the muscle once. for 1 dose 1 each 0     No current facility-administered medications for this visit.         ROS  Review of Systems   Constitutional:  Positive for chills and fever. Negative for fatigue.   HENT:  Negative for ear pain, postnasal drip and sinus pain.    Respiratory:  Negative for cough and shortness of breath.    Cardiovascular:  Negative for chest pain.   Gastrointestinal:  Positive for abdominal distention, abdominal pain and diarrhea. Negative for constipation, nausea and vomiting.   Psychiatric/Behavioral:  Negative for sleep disturbance.          PHYSICAL EXAM  Vitals:    01/30/23 1321   BP: 112/68   BP Location: Left arm   Patient Position: Sitting   BP Method: Medium (Manual)   Pulse: 96   Resp: 18   Temp: 97.9 °F (36.6 °C)   TempSrc: Temporal   SpO2: 96%   Weight: 68.9 kg (151 lb 12.6 oz)   Height: 5' 5" (1.651 m)    Body mass index is 25.26 kg/m².  Weight: 68.9 kg (151 lb 12.6 oz)   Height: 5' 5" (165.1 cm)     Physical Exam  Constitutional:       Appearance: He is well-developed.   HENT:      Head: Normocephalic.      Mouth/Throat:      Pharynx: Uvula midline.   Eyes:      Conjunctiva/sclera: Conjunctivae normal. "   Cardiovascular:      Rate and Rhythm: Normal rate and regular rhythm.      Pulses: Normal pulses.           Radial pulses are 2+ on the right side and 2+ on the left side.      Heart sounds: Normal heart sounds. No murmur heard.  Pulmonary:      Effort: Pulmonary effort is normal.      Breath sounds: Normal breath sounds. No wheezing.   Abdominal:      General: Bowel sounds are normal.      Palpations: Abdomen is soft.      Tenderness: There is abdominal tenderness in the periumbilical area and suprapubic area.      Comments: Mild tenderness to palpation left lower quadrant, suprapubic area.   Skin:     General: Skin is warm and dry.      Findings: No rash.   Neurological:      Mental Status: He is alert and oriented to person, place, and time.         Health Maintenance         Date Due Completion Date    COVID-19 Vaccine (1) Never done ---    Colorectal Cancer Screening Never done ---    Shingles Vaccine (1 of 2) Never done ---    Abdominal Aortic Aneurysm Screening Never done ---    Influenza Vaccine (1) 06/30/2023 (Originally 9/1/2022) 1/4/2022    Override on 10/14/2020: Done    Override on 11/4/2019: Done    Pneumococcal Vaccines (Age 65+) (2 - PCV) 01/30/2024 (Originally 2/1/2022) 2/1/2021    Lipid Panel 01/30/2028 1/30/2023    TETANUS VACCINE 10/20/2030 10/20/2020              Assessment & Plan    Problem List Items Addressed This Visit    None  Visit Diagnoses       Hospital discharge follow-up    -  Primary    Diverticulitis            Encouraged patient to start antibiotic several prescribed by the emergency room.  ER precautions given.  If no improvement in the next 48 hours, recurrence of fevers, worsening abdominal pain patient needs to proceed back to the emergency room for evaluation.    Follow-up: Follow up in about 2 weeks (around 2/13/2023).    Ruth Hylton    Medication List with Changes/Refills   New Medications    VARICELLA-ZOSTER GE-AS01B, PF, (SHINGRIX, PF,) 50 MCG/0.5 ML INJECTION     Inject 0.5 mLs into the muscle once. for 1 dose   Current Medications    ASPIRIN 81 MG CHEW    Take 1 tablet (81 mg total) by mouth once daily.    BEMPEDOIC ACID 180 MG TAB    Take 1 tablet (180 mg total) by mouth once daily.    CILOSTAZOL (PLETAL) 50 MG TAB    Take 1 tablet (50 mg total) by mouth 2 (two) times daily.    CIPROFLOXACIN HCL (CIPRO) 500 MG TABLET    Take 1 tablet (500 mg total) by mouth 2 (two) times daily. for 10 days    DICYCLOMINE (BENTYL) 10 MG CAPSULE    Take 10 mg by mouth 3 (three) times daily.    EVOLOCUMAB (REPATHA SYRINGE) 140 MG/ML SYRG    Inject 140 mg into the skin every 14 (fourteen) days.    METRONIDAZOLE (FLAGYL) 500 MG TABLET    Take 1 tablet (500 mg total) by mouth 3 (three) times daily. for 10 days    NICOTINE (NICODERM CQ) 21 MG/24 HR    Place 1 patch onto the skin once daily.    NICOTINE POLACRILEX 4 MG LOZG    Take 1 lozenge (4 mg total) by mouth as needed (1-2 per hour in place of cigarettes).    ONDANSETRON (ZOFRAN-ODT) 4 MG TBDL    Take 1 tablet (4 mg total) by mouth 3 (three) times daily.   Discontinued Medications    GABAPENTIN (NEURONTIN) 250 MG/5 ML SOLUTION    1/2 tsp po tid for pain or numbness if needed can increase to 1 tsp po qd prn numbness    HYDROCODONE-ACETAMINOPHEN (NORCO) 5-325 MG PER TABLET    Take 1 tablet by mouth every 8 (eight) hours as needed for Pain.

## 2023-02-06 ENCOUNTER — OFFICE VISIT (OUTPATIENT)
Dept: CARDIOLOGY | Facility: CLINIC | Age: 68
End: 2023-02-06
Payer: MEDICARE

## 2023-02-06 VITALS
DIASTOLIC BLOOD PRESSURE: 70 MMHG | WEIGHT: 158.31 LBS | HEART RATE: 87 BPM | SYSTOLIC BLOOD PRESSURE: 115 MMHG | HEIGHT: 65 IN | BODY MASS INDEX: 26.38 KG/M2

## 2023-02-06 DIAGNOSIS — E78.49 OTHER HYPERLIPIDEMIA: ICD-10-CM

## 2023-02-06 DIAGNOSIS — R79.9 ABNORMAL FINDING OF BLOOD CHEMISTRY, UNSPECIFIED: ICD-10-CM

## 2023-02-06 DIAGNOSIS — Z72.0 TOBACCO ABUSE: ICD-10-CM

## 2023-02-06 DIAGNOSIS — Z78.9 STATIN INTOLERANCE: ICD-10-CM

## 2023-02-06 DIAGNOSIS — I99.8 LOWER LIMB ISCHEMIA: ICD-10-CM

## 2023-02-06 DIAGNOSIS — I73.9 PAD (PERIPHERAL ARTERY DISEASE): Primary | ICD-10-CM

## 2023-02-06 PROCEDURE — 99215 PR OFFICE/OUTPT VISIT, EST, LEVL V, 40-54 MIN: ICD-10-PCS | Mod: S$PBB,,, | Performed by: INTERNAL MEDICINE

## 2023-02-06 PROCEDURE — 99215 OFFICE O/P EST HI 40 MIN: CPT | Mod: S$PBB,,, | Performed by: INTERNAL MEDICINE

## 2023-02-06 PROCEDURE — 99999 PR PBB SHADOW E&M-EST. PATIENT-LVL III: CPT | Mod: PBBFAC,,, | Performed by: INTERNAL MEDICINE

## 2023-02-06 PROCEDURE — 99999 PR PBB SHADOW E&M-EST. PATIENT-LVL III: ICD-10-PCS | Mod: PBBFAC,,, | Performed by: INTERNAL MEDICINE

## 2023-02-06 PROCEDURE — 99213 OFFICE O/P EST LOW 20 MIN: CPT | Mod: PBBFAC,PO | Performed by: INTERNAL MEDICINE

## 2023-02-06 NOTE — PROGRESS NOTES
[facility]      Chief Complaint   Patient presents with    Hyperlipidemia    Peripheral Arterial Disease    statin intolerance       Patient ID: Elijah Love is a 67 y.o. male with PAD s/p hospitalization for acute on chronic lower limb ischemia of his left leg (9/2/2022), HLD, smoking, overweight, who presents for a follow up appointment.  Pertinent history/events are as follows:     -Pt kindly referred by Dr. Granado for evaluation of hyperlipidemia.    -At our initial clinic visit on 9/28/2022, Mr. Love reported improvement in LLE claudication symptoms since hospital discharge.  He has no rest pain or tissue loss.  Smokes 1 pack a day for total of 50 years.  States he now participating in smoking cessation.  Labs from 2/1/2021 show total cholesterol 264 with  .  Mr. Love states he was unable to tolerate statins and fenofibrate due to issues with the size of the pills.    Plan:   Hyperlipidemia- Labs from 2/1/2021 show total cholesterol 264 with  .  Mr. Love states he was unable to tolerate statins and fenofibrate due to issues with the size of the pills.  Start bempedoic acid 180 mg daily.     PAD- Mr. Love reports improvement in LLE claudication symptoms since hospital discharge on 9/3/2022.  He has no rest pain or tissue loss.  Continue medical management of PAD with cilostazol 100 mg bid and ASA 81 mg daily.  Pt to start walking program with goal of at least 30 minutes a day/5 days a week.  Smoking- Encourage smoking cessation.  Overweight- Encourage diet, exercise, and weight loss.    HPI:  Mr. Love reports no chest pain, SOB, LE edema, claudication, rest pain, or tissue loss.  LDL 85 on 1/30/2023 vs 154 on 2/1/2021.        Past Medical History:   Diagnosis Date    Kidney stones     Peripheral vascular disease, unspecified      No past surgical history on file.  Social History     Socioeconomic History    Marital status: Single   Tobacco Use    Smoking  status: Every Day     Packs/day: 0.02     Types: Cigarettes    Smokeless tobacco: Never   Substance and Sexual Activity    Alcohol use: Yes    Drug use: Never    Sexual activity: Yes     No family history on file.    Review of patient's allergies indicates:  No Known Allergies    Medication List with Changes/Refills   Current Medications    ASPIRIN 81 MG CHEW    Take 1 tablet (81 mg total) by mouth once daily.    BEMPEDOIC ACID 180 MG TAB    Take 1 tablet (180 mg total) by mouth once daily.    CILOSTAZOL (PLETAL) 50 MG TAB    Take 1 tablet (50 mg total) by mouth 2 (two) times daily.    DICYCLOMINE (BENTYL) 10 MG CAPSULE    Take 10 mg by mouth 3 (three) times daily.    EVOLOCUMAB (REPATHA SYRINGE) 140 MG/ML SYRG    Inject 140 mg into the skin every 14 (fourteen) days.    METRONIDAZOLE (FLAGYL) 500 MG TABLET    Take 1 tablet (500 mg total) by mouth 3 (three) times daily. for 10 days    NICOTINE (NICODERM CQ) 21 MG/24 HR    Place 1 patch onto the skin once daily.    NICOTINE POLACRILEX 4 MG LOZG    Take 1 lozenge (4 mg total) by mouth as needed (1-2 per hour in place of cigarettes).    ONDANSETRON (ZOFRAN-ODT) 4 MG TBDL    Take 1 tablet (4 mg total) by mouth 3 (three) times daily.   Discontinued Medications    CIPROFLOXACIN HCL (CIPRO) 500 MG TABLET    Take 1 tablet (500 mg total) by mouth 2 (two) times daily. for 10 days       Review of Systems  Constitution: Denies chills, fever, and sweats.  HENT: Denies headaches or blurry vision.  Cardiovascular: Denies chest pain or irregular heart beat.  Respiratory: Denies cough or shortness of breath.  Gastrointestinal: Denies abdominal pain, nausea, or vomiting.  Musculoskeletal: Positive for BLE claudication (L>R).  Neurological: Denies dizziness or focal weakness.  Psychiatric/Behavioral: Normal mental status.  Hematologic/Lymphatic: Denies bleeding problem or easy bruising/bleeding.  Skin: Denies rash or suspicious lesions    Physical Examination  /70   Pulse 87    "Ht 5' 5" (1.651 m)   Wt 71.8 kg (158 lb 4.6 oz)   BMI 26.34 kg/m²     Constitutional: No acute distress, conversant  HEENT: Sclera anicteric, Pupils equal, round and reactive to light, extraocular motions intact, Oropharynx clear  Neck: No JVD, no carotid bruits  Cardiovascular: regular rate and rhythm, no murmur, rubs or gallops, normal S1/S2  Pulmonary: Clear to auscultation bilaterally  Abdominal: Abdomen soft, nontender, nondistended, positive bowel sounds  Extremities: No lower extremity edema   Pulses:  Carotid pulses are 2+ on the right side, and 2+ on the left side.  Radial pulses are 2+ on the right side, and 2+ on the left side.   Femoral pulses are 2+ on the right side, and 2+ on the left side.  Popliteal pulses are 2+ on the right side, and 2+ on the left side.   Dorsalis pedis pulses are 2+ on the right side, and 0 on the left side.   Posterior tibial pulses are 2+ on the right side, and 0 on the left side.    Skin: No ecchymosis, erythema, or ulcers  Psych: Alert and oriented x 3, appropriate affect  Neuro: CNII-XII intact, no focal deficits    Labs:  Most Recent Data  CBC:   Lab Results   Component Value Date    WBC 15.28 (H) 01/27/2023    HGB 16.2 01/27/2023    HCT 48.0 01/27/2023     01/27/2023    MCV 88 01/27/2023    RDW 13.4 01/27/2023     BMP:   Lab Results   Component Value Date     01/30/2023    K 4.3 01/30/2023     01/30/2023    CO2 23 01/30/2023    BUN 14 01/30/2023    CREATININE 1.2 01/30/2023     01/30/2023    CALCIUM 9.8 01/30/2023     LFTS;   Lab Results   Component Value Date    PROT 7.9 01/30/2023    ALBUMIN 3.6 01/30/2023    BILITOT 0.3 01/30/2023    AST 21 01/30/2023    ALKPHOS 71 01/30/2023    ALT 15 01/30/2023     COAGS:   Lab Results   Component Value Date    INR 1.1 09/02/2022     FLP:   Lab Results   Component Value Date    CHOL 155 01/30/2023    HDL 45 01/30/2023    LDLCALC 85.4 01/30/2023    TRIG 123 01/30/2023    CHOLHDL 29.0 01/30/2023     CARDIAC: " No results found for: TROPONINI, CKMB, BNP    Imaging:    LLE Arterial Ultrasound 9/2/2022:  Significant stenosis in the distal SFA, popliteal artery, and calf arteries with monophasic dampened waveform and sluggish velocities.  Findings are suggestive of advanced atherosclerosis or thromboembolic process.  Suggest correlation with physical exam and vascular surgery evaluation as appropriate.   Nonvisualization of the left dorsalis pedis artery.    Assessment/Plan:  Elijah Love is a 67 y.o. male with PAD s/p hospitalization for acute on chronic lower limb ischemia of his left leg (9/2/2022), HLD, smoking, overweight, who presents for a follow up appointment.     Hyperlipidemia-  LDL 85 on 1/30/2023 vs 154 on 2/1/2021.  Continue repatha 140 mg subq every 14 days.         2. PAD- Mr. Love reports improvement in LLE claudication symptoms since hospital discharge on 9/3/2022.  He has no rest pain or tissue loss.  Continue medical management of PAD with cilostazol 100 mg bid and ASA 81 mg daily.  Continue walking program with goal of at least 30 minutes a day/5 days a week.    3. Smoking- Encourage smoking cessation.    4. Overweight- Encourage diet, exercise, and weight loss.    Follow up in 6 months with lipids and cmp prior     Total duration of face to face visit time 30 minutes.  Total time spent counseling greater than fifty percent of total visit time.  Counseling included discussion regarding imaging findings, diagnosis, possibilities, treatment options, risks and benefits.  The patient had many questions regarding the options and long-term effects.    Dennis Mckeon MD, PhD  Interventional Cardiology

## 2023-02-06 NOTE — PATIENT INSTRUCTIONS
Assessment/Plan:  Elijah Love is a 67 y.o. male with PAD s/p hospitalization for acute on chronic lower limb ischemia of his left leg (9/2/2022), HLD, smoking, overweight, who presents for a follow up appointment.     Hyperlipidemia-  LDL 85 on 1/30/2023 vs 154 on 2/1/2021.  Continue repatha 140 mg subq every 14 days.         2. PAD- Mr. Love reports improvement in LLE claudication symptoms since hospital discharge on 9/3/2022.  He has no rest pain or tissue loss.  Continue medical management of PAD with cilostazol 100 mg bid and ASA 81 mg daily.  Continue walking program with goal of at least 30 minutes a day/5 days a week.    3. Smoking- Encourage smoking cessation.    4. Overweight- Encourage diet, exercise, and weight loss.    Follow up in 6 months with lipids and cmp prior

## 2023-02-13 ENCOUNTER — OFFICE VISIT (OUTPATIENT)
Dept: PRIMARY CARE CLINIC | Facility: CLINIC | Age: 68
End: 2023-02-13
Payer: MEDICARE

## 2023-02-13 ENCOUNTER — PATIENT OUTREACH (OUTPATIENT)
Dept: ADMINISTRATIVE | Facility: HOSPITAL | Age: 68
End: 2023-02-13
Payer: MEDICARE

## 2023-02-13 VITALS
SYSTOLIC BLOOD PRESSURE: 116 MMHG | RESPIRATION RATE: 16 BRPM | DIASTOLIC BLOOD PRESSURE: 68 MMHG | BODY MASS INDEX: 26.26 KG/M2 | HEART RATE: 94 BPM | HEIGHT: 65 IN | WEIGHT: 157.63 LBS | OXYGEN SATURATION: 95 % | TEMPERATURE: 98 F

## 2023-02-13 DIAGNOSIS — K57.92 DIVERTICULITIS: Primary | ICD-10-CM

## 2023-02-13 PROCEDURE — 99999 PR PBB SHADOW E&M-EST. PATIENT-LVL I: CPT | Mod: PBBFAC,,, | Performed by: NURSE PRACTITIONER

## 2023-02-13 PROCEDURE — 99213 PR OFFICE/OUTPT VISIT, EST, LEVL III, 20-29 MIN: ICD-10-PCS | Mod: S$PBB,,, | Performed by: NURSE PRACTITIONER

## 2023-02-13 PROCEDURE — 99999 PR PBB SHADOW E&M-EST. PATIENT-LVL I: ICD-10-PCS | Mod: PBBFAC,,, | Performed by: NURSE PRACTITIONER

## 2023-02-13 PROCEDURE — 99213 OFFICE O/P EST LOW 20 MIN: CPT | Mod: S$PBB,,, | Performed by: NURSE PRACTITIONER

## 2023-02-13 PROCEDURE — 99211 OFF/OP EST MAY X REQ PHY/QHP: CPT | Mod: PBBFAC,PN | Performed by: NURSE PRACTITIONER

## 2023-02-13 NOTE — PROGRESS NOTES
Chief Complaint  Chief Complaint   Patient presents with    Diverticulitis       HPI    Patient here for follow-up for diverticulitis.  Previously seen 2 weeks ago for abdominal pain secondary to diverticulitis treated with Augmentin suspension times 10 days due to his inability to tolerate large pills.  Patient presents to clinic today for follow-up.  Overall improvement in abdominal pain.  No diarrhea.  No nausea or vomiting.  Stool formed, brown.  No fever or chills.        PAST MEDICAL HISTORY:  Past Medical History:   Diagnosis Date    Kidney stones     Peripheral vascular disease, unspecified        PAST SURGICAL HISTORY:  No past surgical history on file.    SOCIAL HISTORY:  Social History     Socioeconomic History    Marital status: Single   Tobacco Use    Smoking status: Every Day     Packs/day: 0.02     Types: Cigarettes    Smokeless tobacco: Never   Substance and Sexual Activity    Alcohol use: Yes    Drug use: Never    Sexual activity: Yes       FAMILY HISTORY:  No family history on file.    ALLERGIES AND MEDICATIONS: updated and reviewed.  Review of patient's allergies indicates:  No Known Allergies  Current Outpatient Medications   Medication Sig Dispense Refill    aspirin 81 MG Chew Take 1 tablet (81 mg total) by mouth once daily. 360 tablet 0    bempedoic acid 180 mg Tab Take 1 tablet (180 mg total) by mouth once daily. 30 tablet 11    cilostazoL (PLETAL) 50 MG Tab Take 1 tablet (50 mg total) by mouth 2 (two) times daily. 60 tablet 11    dicyclomine (BENTYL) 10 MG capsule Take 10 mg by mouth 3 (three) times daily.      evolocumab (REPATHA SYRINGE) 140 mg/mL Syrg Inject 140 mg into the skin every 14 (fourteen) days. 10 mL 10    nicotine polacrilex 4 MG Lozg Take 1 lozenge (4 mg total) by mouth as needed (1-2 per hour in place of cigarettes). 72 lozenge 0    ondansetron (ZOFRAN-ODT) 4 MG TbDL Take 1 tablet (4 mg total) by mouth 3 (three) times daily. (Patient not taking: Reported on 2/6/2023) 10 tablet  0     No current facility-administered medications for this visit.         ROS  Review of Systems   Constitutional:  Negative for chills and fever.   HENT:  Negative for ear pain, postnasal drip and sinus pain.    Respiratory:  Negative for cough and shortness of breath.    Cardiovascular:  Negative for chest pain.   Gastrointestinal:  Negative for diarrhea, nausea and vomiting.         PHYSICAL EXAM  There were no vitals filed for this visit. There is no height or weight on file to calculate BMI.            Physical Exam  Constitutional:       Appearance: He is well-developed.   HENT:      Head: Normocephalic.      Mouth/Throat:      Pharynx: Uvula midline.   Eyes:      Conjunctiva/sclera: Conjunctivae normal.   Cardiovascular:      Rate and Rhythm: Normal rate and regular rhythm.      Pulses: Normal pulses.           Radial pulses are 2+ on the right side and 2+ on the left side.      Heart sounds: Normal heart sounds. No murmur heard.  Pulmonary:      Effort: Pulmonary effort is normal.      Breath sounds: Normal breath sounds. No wheezing.   Abdominal:      General: Bowel sounds are normal.      Palpations: Abdomen is soft.      Tenderness: There is no abdominal tenderness.   Skin:     General: Skin is warm and dry.      Findings: No rash.   Neurological:      Mental Status: He is alert and oriented to person, place, and time.         Health Maintenance         Date Due Completion Date    COVID-19 Vaccine (1) Never done ---    Hemoglobin A1c (Diabetic Prevention Screening) Never done ---    Colorectal Cancer Screening Never done ---    Shingles Vaccine (1 of 2) Never done ---    Abdominal Aortic Aneurysm Screening Never done ---    Influenza Vaccine (1) 06/30/2023 (Originally 9/1/2022) 1/4/2022    Override on 10/14/2020: Done    Override on 11/4/2019: Done    Pneumococcal Vaccines (Age 65+) (2 - PCV) 01/30/2024 (Originally 2/1/2022) 2/1/2021    Lipid Panel 01/30/2028 1/30/2023    TETANUS VACCINE 10/20/2030  10/20/2020              Assessment & Plan    Problem List Items Addressed This Visit    None  Visit Diagnoses       Diverticulitis    -  Primary   Resolved.  Follow up as necessary.         Follow-up: Follow up if symptoms worsen or fail to improve.    Ruth Hylton    Medication List with Changes/Refills   Current Medications    ASPIRIN 81 MG CHEW    Take 1 tablet (81 mg total) by mouth once daily.    BEMPEDOIC ACID 180 MG TAB    Take 1 tablet (180 mg total) by mouth once daily.    CILOSTAZOL (PLETAL) 50 MG TAB    Take 1 tablet (50 mg total) by mouth 2 (two) times daily.    DICYCLOMINE (BENTYL) 10 MG CAPSULE    Take 10 mg by mouth 3 (three) times daily.    EVOLOCUMAB (REPATHA SYRINGE) 140 MG/ML SYRG    Inject 140 mg into the skin every 14 (fourteen) days.    NICOTINE POLACRILEX 4 MG LOZG    Take 1 lozenge (4 mg total) by mouth as needed (1-2 per hour in place of cigarettes).    ONDANSETRON (ZOFRAN-ODT) 4 MG TBDL    Take 1 tablet (4 mg total) by mouth 3 (three) times daily.

## 2023-02-13 NOTE — PROGRESS NOTES
Health Maintenance Due   Topic Date Due    COVID-19 Vaccine (1) Never done    Hemoglobin A1c (Diabetic Prevention Screening)  Never done    Colorectal Cancer Screening  Never done    Shingles Vaccine (1 of 2) Never done    Abdominal Aortic Aneurysm Screening  Never done        Chart review done.    updated.   Immunizations reviewed & updated.   Care Everywhere updated.   [facility] reviewed

## 2023-02-27 ENCOUNTER — SPECIALTY PHARMACY (OUTPATIENT)
Dept: PHARMACY | Facility: CLINIC | Age: 68
End: 2023-02-27
Payer: MEDICARE

## 2023-02-27 NOTE — TELEPHONE ENCOUNTER
Specialty Pharmacy - Refill Coordination    Specialty Medication Orders Linked to Encounter      Flowsheet Row Most Recent Value   Medication #1 evolocumab (REPATHA SYRINGE) 140 mg/mL Syrg (Order#[id number], Rx#[id number])            Refill Questions - Documented Responses      Flowsheet Row Most Recent Value   Patient Availability and HIPAA Verification    Does patient want to proceed with activity? Yes   HIPAA/medical authority confirmed? Yes   Relationship to patient of person spoken to? Self   Refill Screening Questions    Would patient like to speak to a pharmacist? No   When does the patient need to receive the medication? 03/04/23   Refill Delivery Questions    How will the patient receive the medication? [facility]   When does the patient need to receive the medication? 03/04/23   Shipping Address Home   Address in [facility] confirmed and updated if neccessary? Yes   Expected Copay ($) 4.3   Is the patient able to afford the medication copay? Yes   Payment Method CC on file   Days supply of Refill 28   Supplies needed? No supplies needed   Refill activity completed? Yes   Refill activity plan Refill scheduled   Shipment/Pickup Date: 03/01/23            Current Outpatient Medications   Medication Sig    aspirin 81 MG Chew Take 1 tablet (81 mg total) by mouth once daily.    bempedoic acid 180 mg Tab Take 1 tablet (180 mg total) by mouth once daily.    cilostazoL (PLETAL) 50 MG Tab Take 1 tablet (50 mg total) by mouth 2 (two) times daily.    dicyclomine (BENTYL) 10 MG capsule Take 10 mg by mouth 3 (three) times daily.    evolocumab (REPATHA SYRINGE) 140 mg/mL Syrg Inject 140 mg into the skin every 14 (fourteen) days.    nicotine polacrilex 4 MG Lozg Take 1 lozenge (4 mg total) by mouth as needed (1-2 per hour in place of cigarettes).    ondansetron (ZOFRAN-ODT) 4 MG TbDL Take 1 tablet (4 mg total) by mouth 3 (three) times daily.   Last reviewed on 2/13/2023  2:00 PM by Paradise Gustafson LPN    Review of  patient's allergies indicates:  No Known Allergies Last reviewed on  2/13/2023 2:00 PM by Paradise Gustafson      Tasks added this encounter   3/25/2023 - Refill Call (Auto Added)   Tasks due within next 3 months   No tasks due.     Coco Baires, Patient Care Assistant  [facility]  [address]  [facility] [address]  Phone: [phone]  Fax: [phone]

## 2023-03-15 ENCOUNTER — CLINICAL SUPPORT (OUTPATIENT)
Dept: SMOKING CESSATION | Facility: CLINIC | Age: 68
End: 2023-03-15

## 2023-03-15 DIAGNOSIS — F17.200 NICOTINE DEPENDENCE: Primary | ICD-10-CM

## 2023-03-15 PROCEDURE — 99407 PR TOBACCO USE CESSATION INTENSIVE >10 MINUTES: ICD-10-PCS | Mod: S$GLB,,,

## 2023-03-15 PROCEDURE — 99407 BEHAV CHNG SMOKING > 10 MIN: CPT | Mod: S$GLB,,,

## 2023-03-15 NOTE — PROGRESS NOTES
Spoke with patient today in regard to smoking cessation progress for 6 month telephone follow up, he states not tobacco free. Patient states smoking 3-4 cpd down from almost 2 ppd using the nicotine patch and lozenge. Commended patient on the accomplishment thus far. Patient is not ready to return to the program at this time. Informed patient of benefit period, future follow up, and contact information if any further help or support is needed. Will complete smart form for 6 month follow up on Quit attempt #1.

## 2023-03-16 ENCOUNTER — PATIENT OUTREACH (OUTPATIENT)
Dept: ADMINISTRATIVE | Facility: HOSPITAL | Age: 68
End: 2023-03-16
Payer: MEDICARE

## 2023-03-20 ENCOUNTER — OFFICE VISIT (OUTPATIENT)
Dept: VASCULAR SURGERY | Facility: CLINIC | Age: 68
End: 2023-03-20
Attending: SURGERY
Payer: MEDICARE

## 2023-03-20 VITALS
TEMPERATURE: 98 F | HEART RATE: 87 BPM | BODY MASS INDEX: 26.45 KG/M2 | HEIGHT: 65 IN | WEIGHT: 158.75 LBS | DIASTOLIC BLOOD PRESSURE: 75 MMHG | SYSTOLIC BLOOD PRESSURE: 131 MMHG

## 2023-03-20 DIAGNOSIS — I70.212 ATHEROSCLEROSIS OF NATIVE ARTERIES OF EXTREMITIES WITH INTERMITTENT CLAUDICATION, LEFT LEG: Primary | ICD-10-CM

## 2023-03-20 DIAGNOSIS — E78.5 HYPERLIPIDEMIA, UNSPECIFIED HYPERLIPIDEMIA TYPE: ICD-10-CM

## 2023-03-20 DIAGNOSIS — F17.210 NICOTINE DEPENDENCE, CIGARETTES, UNCOMPLICATED: ICD-10-CM

## 2023-03-20 DIAGNOSIS — I73.9 PAD (PERIPHERAL ARTERY DISEASE): Primary | ICD-10-CM

## 2023-03-20 PROCEDURE — 99999 PR PBB SHADOW E&M-EST. PATIENT-LVL III: ICD-10-PCS | Mod: PBBFAC,,, | Performed by: SURGERY

## 2023-03-20 PROCEDURE — 99999 PR PBB SHADOW E&M-EST. PATIENT-LVL III: CPT | Mod: PBBFAC,,, | Performed by: SURGERY

## 2023-03-20 PROCEDURE — 99213 OFFICE O/P EST LOW 20 MIN: CPT | Mod: S$PBB,,, | Performed by: SURGERY

## 2023-03-20 PROCEDURE — 99213 PR OFFICE/OUTPT VISIT, EST, LEVL III, 20-29 MIN: ICD-10-PCS | Mod: S$PBB,,, | Performed by: SURGERY

## 2023-03-20 PROCEDURE — 99213 OFFICE O/P EST LOW 20 MIN: CPT | Mod: PBBFAC | Performed by: SURGERY

## 2023-03-20 NOTE — PROGRESS NOTES
VASCULAR SURGERY NOTE    Patient ID: Elijah Love is a 67 y.o. male.    I. HISTORY     Chief Complaint: LLE claudication    HPI 03/20/2023: Elijah Love is a 67 y.o. male who is here today for follow up appointment  for lower extremity claudication. States that his left lower extremity pain with walking is significantly improved and does not limit him in any way now.  Reports he was previously unable to ambulate half a city block but is now able to cut his grass and ambulate unassisted at the grocery store without having to rest due to pain. He thinks he can walk about 4 blocks before he has to stop due to pain in his left calf. He is walking regularly to try and improve his walking distance. He is currently smoking around 6 cigarettes daily after total smoking cessation for a few weeks.  He is actively participating in cessation efforts with nicotine patches and lozenges and indicates he is very motivated to quit.       See previous history and HPI's below.    HPI 12/19/22:  Patient reports that he is feeling well and that his claudication symptoms are continuing to improve. He notes that he is able to walk farther before his experiences leg pain. He was able to cut his grass for the first time in 3 months. He is continuing to drink 2L daily. He is now down to 1 cigarette daily and is motivated to quit completely. Is still attending [facility] smoking cessation program and using the patch.     HPI 10/24/22: Today he returns and states that his claudication symptoms have continued to improve.  He was able to walk through [employer] and go shopping without stopping the other day.  He no longer has to take a scooter around the store.  He has been working hard to walk regularly and hydrate with at least 2 L of water per day.  He has also continued to take his cilostazol twice daily.  He has been able to cut down his smoking to 10 cigarettes per day which is significantly improved from 1.5ppd.  He continues with the  [facility] smoking cessation program and has attended the classes regularly.  His goal is to quit smoking and he is wearing a patch to help him quit.  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 He realizes that he will have to change his behavior in order to cut out the remaining cigarettes and he will continue to work on this.  He was also able to see Dr. Mckeon and was prescribed Praluent to manage his hyperlipidemia since he is unable to swallow the statin tablets.  Thankfully he has been able to swallow the small cilostazol tablets.      HPI 9/16/22:  ...recent hospitalization on 9/3/22 for acute on chronic lower limb ischemia of his left leg. Prior to presentation at that time, he complained of claudication symptoms and was without any history of rest pain. He was treated with heparin gtt and fluid resuscitation in which he had improvement of symptoms. He is currently taking a chewable baby ASA daily. Due to inability to tolerate swallowing pills, he was not started on a statin.     Patient states he has been doing better since he was discharged. He complains of leg pain after walking about a block or so. States he started having calf and shin pain after walking from the garage to the hospital entrance. The pain resolves with rest. The quality of the pain is crampy. The pain starts on the calf first. Since discharge, patient reports he has kept his hydration up and only drinks water instead of tea now. He also has cut back smoking from 1.5 pack/day to only about 5 cigarettes/day!!! I applauded him on his efforts and encouraged him to continue decreasing down to quitting.  States he is motivated to fully quit but is finding it hard to get through these last few cigarettes to completely quit.          Past Medical History:   Diagnosis Date    Kidney stones     Peripheral vascular disease, unspecified         Surgical History:  Tonsil/adenoidectomy    Asdf   Social History     Occupational  History    Not on file   Tobacco Use    Smoking status: Every Day     Packs/day: 0.02     Types: Cigarettes    Smokeless tobacco: Never   Substance and Sexual Activity    Alcohol use: Yes    Drug use: Never    Sexual activity: Yes       Review of Systems   Constitutional: Negative for weight loss.   HENT:  Negative for ear pain and nosebleeds.    Eyes:  Negative for discharge and pain.   Cardiovascular:  Negative for chest pain and palpitations.   Respiratory:  Negative for cough, shortness of breath and wheezing.    Endocrine: Negative for cold intolerance, heat intolerance and polyphagia.   Hematologic/Lymphatic: Negative for adenopathy. Does not bruise/bleed easily.   Skin:  Negative for itching and rash.   Musculoskeletal:  Negative for joint swelling and muscle cramps.   Gastrointestinal:  Negative for abdominal pain, diarrhea, nausea and vomiting.   Genitourinary:  Negative for dysuria and flank pain.   Neurological:  Negative for numbness and seizures.       II. PHYSICAL EXAM     Physical Exam  Constitutional:       General: He is not in acute distress.     Appearance: Normal appearance. He is not ill-appearing or diaphoretic.   HENT:      Head: Normocephalic and atraumatic.   Eyes:      General: No scleral icterus.        Right eye: No discharge.         Left eye: No discharge.      Extraocular Movements: Extraocular movements intact.      Conjunctiva/sclera: Conjunctivae normal.   Cardiovascular:      Rate and Rhythm: Normal rate and regular rhythm.   Pulmonary:      Effort: Pulmonary effort is normal. No respiratory distress.   Musculoskeletal:         General: Normal range of motion.      Cervical back: Normal range of motion and neck supple.      Right lower leg: No edema.      Left lower leg: No edema.      Comments:   LLE:  Palpable PT pulse  Non-palpable DP pulse   Foot is warm and well-perfused    Skin:     General: Skin is warm and dry.      Capillary Refill: Capillary refill takes 2 to 3 seconds.       Coloration: Skin is not jaundiced or pale.      Findings: No erythema or rash.   Neurological:      General: No focal deficit present.      Mental Status: He is alert and oriented to person, place, and time. Mental status is at baseline.      Cranial Nerves: No cranial nerve deficit.   Psychiatric:         Mood and Affect: Mood normal.         Behavior: Behavior normal.     III. ASSESSMENT & PLAN (MEDICAL DECISION MAKING)     1. Atherosclerosis of native arteries of extremities with intermittent claudication, left leg    2. Nicotine dependence, cigarettes, uncomplicated    3. Hyperlipidemia, unspecified hyperlipidemia type              Imaging Results: (I have personally reviewed the images/studies and provided my interpretation below)    U/S 9/2: significant stenosis in distal SFA, popliteal artery and PT/AT.    ALESSANDRA 10/24/22:  RLE: 1.13  LLE: 0.58  Findings suggest no evidence of PAD on the right.  There is moderate to severe PAD on the left.    Assessment/Diagnosis and Plan:  67 y.o. male with LLE claudication.  Symptoms are no longer lifestyle limiting. He still has room to improve his medical management. Explained importance of smoking abstinence to improve durability of any surgical treatment if necessary in the future as well as to reduce risk of worsening PAD, cerebrovascular disease, coronary disease, COPD etc. patient expressed understanding and was in agreement with the treatment plan.    -Continue Cilostazol 50mg BID   -Walking program at least 4x/week for 30min per session. Instructions provided  -Continue chewable ASA 81 mg daily. Recommended for all patients with PAD  -Continue to work on cutting smoking down until fully quit.   -Continue repatha  -Control of atherosclerotic risk factors: Goal LDL <100, Goal HbA1C <7, Goal BP <140/90  -Follow up in 6m for surveillance ALESSANDRA      ALEXIS Granado II, MD, VI  Vascular Surgeon  [facility]

## 2023-03-21 ENCOUNTER — OFFICE VISIT (OUTPATIENT)
Dept: PRIMARY CARE CLINIC | Facility: CLINIC | Age: 68
End: 2023-03-21
Payer: MEDICARE

## 2023-03-21 VITALS
TEMPERATURE: 98 F | HEART RATE: 99 BPM | OXYGEN SATURATION: 95 % | HEIGHT: 65 IN | BODY MASS INDEX: 26.14 KG/M2 | WEIGHT: 156.88 LBS | DIASTOLIC BLOOD PRESSURE: 70 MMHG | RESPIRATION RATE: 18 BRPM | SYSTOLIC BLOOD PRESSURE: 124 MMHG

## 2023-03-21 DIAGNOSIS — Z72.0 TOBACCO ABUSE: ICD-10-CM

## 2023-03-21 DIAGNOSIS — M79.662 PAIN IN LEFT LOWER LEG: ICD-10-CM

## 2023-03-21 DIAGNOSIS — Z12.11 SCREENING FOR COLON CANCER: Primary | ICD-10-CM

## 2023-03-21 DIAGNOSIS — E78.49 OTHER HYPERLIPIDEMIA: ICD-10-CM

## 2023-03-21 DIAGNOSIS — Z79.899 ENCOUNTER FOR LONG-TERM (CURRENT) USE OF MEDICATIONS: ICD-10-CM

## 2023-03-21 DIAGNOSIS — Z87.442 HISTORY OF NEPHROLITHIASIS: ICD-10-CM

## 2023-03-21 DIAGNOSIS — I99.8 LOWER LIMB ISCHEMIA: ICD-10-CM

## 2023-03-21 PROCEDURE — 99214 PR OFFICE/OUTPT VISIT, EST, LEVL IV, 30-39 MIN: ICD-10-PCS | Mod: S$PBB,,, | Performed by: FAMILY MEDICINE

## 2023-03-21 PROCEDURE — 99999 PR PBB SHADOW E&M-EST. PATIENT-LVL III: ICD-10-PCS | Mod: PBBFAC,,, | Performed by: FAMILY MEDICINE

## 2023-03-21 PROCEDURE — 99214 OFFICE O/P EST MOD 30 MIN: CPT | Mod: S$PBB,,, | Performed by: FAMILY MEDICINE

## 2023-03-21 PROCEDURE — 99213 OFFICE O/P EST LOW 20 MIN: CPT | Mod: PBBFAC,PN | Performed by: FAMILY MEDICINE

## 2023-03-21 PROCEDURE — 99999 PR PBB SHADOW E&M-EST. PATIENT-LVL III: CPT | Mod: PBBFAC,,, | Performed by: FAMILY MEDICINE

## 2023-03-21 NOTE — PROGRESS NOTES
Subjective:       Patient ID: Elijah Love is a 67 y.o. male.    Chief Complaint: Follow-up (3 month follow up left leg pain)      HPI: 68 yo WM -in for 3 month checkup--states left leg pain better  Patient on Pletal and a baby aspirin per day for circulation in legs  Hyperlipidemia on Repatha shot---1 shot every other week--unable swallow pills  History of diverticulitis---January 2023 had sigmoid diverticulitis treated with oral antibiotics pain has subsided      ROS:  Skin: no psoriasis, eczema, skin cancer  HEENT: No headache, ocular pain, blurred vision, diplopia, epistaxis, hoarseness change in voice, thyroid trouble  Lung: No pneumonia, asthma, Tb, wheezing, SOB, +smoking 1 pack per day  Heart: No chest pain, ankle edema, palpitations, MI, micheal murmur, hypertension, +hyperlipidemia--difficulty swallowing pills no stent bypass arrhythmia  Abdomen: No nausea, vomiting, diarrhea, constipation, ulcers, hepatitis, gallbladder disease, melena, hematochezia, hematemesis history of acute diverticulitis see history of present illness  : no UTI, renal disease, +  stones x2 in 1980s-no prostate problems  MS: no fractures, O/A, lupus, rheumatoid, gout  Neuro: No dizziness, LOC, seizures   No diabetes, no anemia, no anxiety, no depression  Single, no children, work laid off , lives alone     Objective:   Physical Exam:  General: Well nourished, well developed, no acute distress  Skin: No lesions  HEENT: Eyes PERRLA, EOM intact, nose patent, throat non-erythematous   NECK: Supple, no bruits, No JVD, no nodes  Lungs: Clear, no rales, rhonchi, wheezing  Heart: Regular rate and rhythm, no murmurs, gallops, or rubs  Abdomen: flat, bowel sounds positive, no tenderness, or organomegaly  Genitalia circumcised--no hernia--testes descended  Rectal--slight tightness to the rectal area--prostate normal no hemorrhoids no fissure  MS:  ROM and MS  intact   Neuro: Alert, CN intact, oriented X 3  Extremities: No cyanosis,  clubbing, or edema         Assessment:       1. Screening for colon cancer    2. Other hyperlipidemia    3. Lower limb ischemia    4. History of nephrolithiasis    5. Pain in left lower leg    6. Tobacco abuse    7. Encounter for long-term (current) use of medications        Plan:       Screening for colon cancer  -     Case Request Endoscopy: COLONOSCOPY    Other hyperlipidemia  -     US Abdominal Aorta; Future; Expected date: 03/21/2023  -     CBC Auto Differential; Future; Expected date: 09/21/2023  -     Comprehensive Metabolic Panel; Future; Expected date: 09/21/2023  -     Hemoglobin A1C; Future; Expected date: 09/21/2023  -     Lipid Panel; Future; Expected date: 09/21/2023    Lower limb ischemia    History of nephrolithiasis    Pain in left lower leg    Tobacco abuse    Encounter for long-term (current) use of medications  -     Hemoglobin A1C; Future; Expected date: 09/21/2023        Left leg pain--PVD --is walking thru the pain --on pletal and baby asa   Hyperlipidemia on Repatha   Tobacco abuse--DC smoking--cutting down smoking   Dental caries especially upper jaws bilaterally needs to see dentist  History nephrolithiasis 20 years ago no problems now  Physical exam CBCs CMP lipids HGBA1C in 6 mo

## 2023-03-27 ENCOUNTER — SPECIALTY PHARMACY (OUTPATIENT)
Dept: PHARMACY | Facility: CLINIC | Age: 68
End: 2023-03-27
Payer: MEDICARE

## 2023-03-27 NOTE — TELEPHONE ENCOUNTER
Sent staff message to [facility] , order discontinued on 3/21 but according to chart notes patient was to continue medication.    PER [facility] , ok'ed to reactivate script.    Specialty Pharmacy - Refill Coordination    Specialty Medication Orders Linked to Encounter      Flowsheet Row Most Recent Value   Medication #1 evolocumab (REPATHA SYRINGE) 140 mg/mL Syrg (Order#[id number], Rx#[id number])            Refill Questions - Documented Responses      Flowsheet Row Most Recent Value   Patient Availability and HIPAA Verification    Does patient want to proceed with activity? Yes   HIPAA/medical authority confirmed? Yes   Relationship to patient of person spoken to? Self   Refill Screening Questions    Would patient like to speak to a pharmacist? No   When does the patient need to receive the medication? 04/02/23   Refill Delivery Questions    How will the patient receive the medication? MEDRx   When does the patient need to receive the medication? 04/02/23   Shipping Address Home   Address in [facility] confirmed and updated if neccessary? Yes   Expected Copay ($) 4.3   Is the patient able to afford the medication copay? Yes   Payment Method CC on file   Days supply of Refill 28   Supplies needed? No supplies needed   Refill activity completed? Yes   Refill activity plan Refill scheduled   Shipment/Pickup Date: 03/28/23            Current Outpatient Medications   Medication Sig    aspirin 81 MG Chew Take 1 tablet (81 mg total) by mouth once daily.    cilostazoL (PLETAL) 50 MG Tab Take 1 tablet (50 mg total) by mouth 2 (two) times daily.    evolocumab (REPATHA SYRINGE) 140 mg/mL Syrg Inject 140 mg into the skin every 14 (fourteen) days.    nicotine polacrilex 4 MG Lozg Take 1 lozenge (4 mg total) by mouth as needed (1-2 per hour in place of cigarettes).   Last reviewed on 3/21/2023  9:47 AM by Dee Dee Kapoor MA    Review of patient's allergies indicates:  No Known Allergies Last reviewed on  3/21/2023 9:45 AM by Dee Dee  Emelia      Tasks added this encounter   4/23/2023 - Refill Call (Auto Added)   Tasks due within next 3 months   No tasks due.     Nickie Cormier, PharmD  [facility]  [address] [facility] [address]  [facility] [address]  Phone: [phone]  Fax: [phone]

## 2023-04-05 ENCOUNTER — TELEPHONE (OUTPATIENT)
Dept: SURGERY | Facility: CLINIC | Age: 68
End: 2023-04-05
Payer: MEDICARE

## 2023-04-05 NOTE — TELEPHONE ENCOUNTER
Called patient reference to a referral to  Ambulatory Colorectal Surgery for colon cancer screening, left voice message.

## 2023-04-21 ENCOUNTER — PATIENT MESSAGE (OUTPATIENT)
Dept: ADMINISTRATIVE | Facility: HOSPITAL | Age: 68
End: 2023-04-21
Payer: MEDICARE

## 2023-04-27 ENCOUNTER — SPECIALTY PHARMACY (OUTPATIENT)
Dept: PHARMACY | Facility: CLINIC | Age: 68
End: 2023-04-27
Payer: MEDICARE

## 2023-04-27 NOTE — TELEPHONE ENCOUNTER
Specialty Pharmacy - Refill Coordination    Specialty Medication Orders Linked to Encounter      Flowsheet Row Most Recent Value   Medication #1 evolocumab (REPATHA SYRINGE) 140 mg/mL Syrg (Order#[id number], Rx#[id number])            Refill Questions - Documented Responses      Flowsheet Row Most Recent Value   Patient Availability and HIPAA Verification    Does patient want to proceed with activity? Yes   HIPAA/medical authority confirmed? Yes   Relationship to patient of person spoken to? Self   Refill Screening Questions    Would patient like to speak to a pharmacist? No   When does the patient need to receive the medication? 04/30/23   Refill Delivery Questions    How will the patient receive the medication? MEDRx   When does the patient need to receive the medication? 04/30/23   Shipping Address Home   Address in [facility] confirmed and updated if neccessary? Yes   Expected Copay ($) 4.3   Is the patient able to afford the medication copay? Yes   Payment Method CC on file   Days supply of Refill 28   Supplies needed? No supplies needed   Refill activity completed? Yes   Refill activity plan Refill scheduled   Shipment/Pickup Date: 04/27/23            Current Outpatient Medications   Medication Sig    aspirin 81 MG Chew Take 1 tablet (81 mg total) by mouth once daily.    cilostazoL (PLETAL) 50 MG Tab Take 1 tablet (50 mg total) by mouth 2 (two) times daily.    evolocumab (REPATHA SYRINGE) 140 mg/mL Syrg Inject 140 mg into the skin every 14 (fourteen) days.    nicotine polacrilex 4 MG Lozg Take 1 lozenge (4 mg total) by mouth as needed (1-2 per hour in place of cigarettes).   Last reviewed on 3/21/2023  9:47 AM by Dee Dee Kapoor MA    Review of patient's allergies indicates:  No Known Allergies Last reviewed on  3/21/2023 9:45 AM by Dee Dee Kapoor      Tasks added this encounter   No tasks added.   Tasks due within next 3 months   4/29/2023 - Refill Coordination Outreach (1 time occurrence)     Kathy  [facility]  [address]  [facility] [address]  Phone: [phone]  Fax: [phone]

## 2023-05-09 ENCOUNTER — PATIENT MESSAGE (OUTPATIENT)
Dept: SURGERY | Facility: CLINIC | Age: 68
End: 2023-05-09
Payer: MEDICARE

## 2023-05-09 ENCOUNTER — TELEPHONE (OUTPATIENT)
Dept: SURGERY | Facility: CLINIC | Age: 68
End: 2023-05-09
Payer: MEDICARE

## 2023-05-09 RX ORDER — SODIUM PICOSULFATE, MAGNESIUM OXIDE, AND ANHYDROUS CITRIC ACID 10; 3.5; 12 MG/160ML; G/160ML; G/160ML
LIQUID ORAL
Status: CANCELLED | OUTPATIENT
Start: 2023-05-09

## 2023-05-09 RX ORDER — SODIUM PICOSULFATE, MAGNESIUM OXIDE, AND ANHYDROUS CITRIC ACID 10; 3.5; 12 MG/160ML; G/160ML; G/160ML
LIQUID ORAL
Qty: 320 ML | Refills: 0 | Status: SHIPPED | OUTPATIENT
Start: 2023-05-09 | End: 2023-09-18

## 2023-05-09 NOTE — TELEPHONE ENCOUNTER
The patient has been advised the Colonoscopy Prep Kit will be ordered from the patient's verified preferred pharmacy on file. The medication can  be picked up by the patient, or the patient's designated representative.The patient was further explained the Colonoscopy Prep instructions will be mailed to the patient verified mailing address on file, or put onto the [employer] portal, whichever method of delivery the patient prefers.Additionally this patient was informed,not to follow the instructions that comes with the bowel prep medication. However, the patient was instructed to please follow the Colonoscopy Bowel Prep instructions that's being provided by the . The patient was asked to please to follow the Colonoscopy Prep instructions being provided as precisely,and  meticulously as possible.The patient was advised you  will receive a follow up phone call to summarize the Colonoscopy Prep instructions prior to the scheduled Colonoscopy procedure date. At this time the patient will be given an opportunity to ask any questions regarding the Colonoscopy procedure, and it's associated Bowel Prep instructions.

## 2023-05-09 NOTE — TELEPHONE ENCOUNTER
Called patient in reference to a referral to Colorectal Surgery for colon cancer screening. Patient verbally consented to a Colonoscopy and requested to be scheduled for a Colonoscopy on 06/14/2023 Patient was advised a designated  is required on the day of the Colonoscopy to drive the patient home and the  must be at least. 18 years old.Colonoscopy Prep instructions were thoroughly explained and discussed with the patient.It was emphasized, and reiterated to the patient, to please not to follow the bowel prep instructions that comes with the bowel prep package.However, to please follow the prep instructions that will be received in the mail,or via the [employer] portal, or by both modes of delivery, which ever method of delivery the patient prefers,from the [facility]   Patient acknowledges understanding Prep instructions as explained and discussed on the phone.. Patient was advised the Colonoscopy Prep instructions discussed and explained on the phone,are being mailed out to the patient's verified address on file,or put onto the [employer] portal,or both methods of delivery, whichever the patient prefers. Patient's address on file was verified with the patient for accuracy of mailing. Patient's medications on file was reviewed with the patient for accuracy of information. Patient denies taking  any other medications other than those listed and verified on medication profile.Patient was explained the Colonoscopy will be performed here at [facility]. Patient was further explained the Pre-Op will call one day prior to the procedure date, to discuss Pre-Op instructions;and what time to report for the Colonoscopy. The patient was given the opportunity to ask any questions about the Colonoscopy. No further issues were discussed.

## 2023-05-23 ENCOUNTER — SPECIALTY PHARMACY (OUTPATIENT)
Dept: PHARMACY | Facility: CLINIC | Age: 68
End: 2023-05-23
Payer: MEDICARE

## 2023-05-23 ENCOUNTER — PATIENT MESSAGE (OUTPATIENT)
Dept: RESEARCH | Facility: HOSPITAL | Age: 68
End: 2023-05-23
Payer: MEDICARE

## 2023-05-23 NOTE — TELEPHONE ENCOUNTER
Specialty Pharmacy - Refill Coordination    Specialty Medication Orders Linked to Encounter      Flowsheet Row Most Recent Value   Medication #1 evolocumab (REPATHA SYRINGE) 140 mg/mL Syrg (Order#[id number], Rx#[id number])            Refill Questions - Documented Responses      Flowsheet Row Most Recent Value   Refill Screening Questions    Would patient like to speak to a pharmacist? No   When does the patient need to receive the medication? 05/28/23   Refill Delivery Questions    How will the patient receive the medication? MEDRx   When does the patient need to receive the medication? 05/28/23   Shipping Address Home   Address in [facility] confirmed and updated if neccessary? Yes   Expected Copay ($) 4.3   Is the patient able to afford the medication copay? Yes   Payment Method CC on file   Days supply of Refill 28   Supplies needed? No supplies needed   Refill activity completed? Yes   Refill activity plan Refill scheduled   Shipment/Pickup Date: 05/25/23            Current Outpatient Medications   Medication Sig    aspirin 81 MG Chew Take 1 tablet (81 mg total) by mouth once daily.    cilostazoL (PLETAL) 50 MG Tab Take 1 tablet (50 mg total) by mouth 2 (two) times daily.    evolocumab (REPATHA SYRINGE) 140 mg/mL Syrg Inject 140 mg into the skin every 14 (fourteen) days.    nicotine polacrilex 4 MG Lozg Take 1 lozenge (4 mg total) by mouth as needed (1-2 per hour in place of cigarettes).    sod picosulf-mag ox-citric ac (CLENPIQ) 10 mg-3.5 gram- 12 gram/160 mL Soln Take As Directed.   Last reviewed on 3/21/2023  9:47 AM by Dee Dee Kapoor MA    Review of patient's allergies indicates:  No Known Allergies Last reviewed on  3/21/2023 9:45 AM by Dee Dee Kapoor      Tasks added this encounter   No tasks added.   Tasks due within next 3 months   5/18/2023 - Refill Coordination Outreach (1 time occurrence)     [facility]  [address]  [facility]  [address]  Phone: [phone]  Fax: [phone]

## 2023-06-05 ENCOUNTER — TELEPHONE (OUTPATIENT)
Dept: SURGERY | Facility: CLINIC | Age: 68
End: 2023-06-05
Payer: MEDICARE

## 2023-06-16 ENCOUNTER — SPECIALTY PHARMACY (OUTPATIENT)
Dept: PHARMACY | Facility: CLINIC | Age: 68
End: 2023-06-16
Payer: MEDICARE

## 2023-06-16 NOTE — TELEPHONE ENCOUNTER
Specialty Pharmacy - Refill Coordination    Specialty Medication Orders Linked to Encounter      Flowsheet Row Most Recent Value   Medication #1 evolocumab (REPATHA SYRINGE) 140 mg/mL Syrg (Order#[id number], Rx#[id number])            Refill Questions - Documented Responses      Flowsheet Row Most Recent Value   Patient Availability and HIPAA Verification    Does patient want to proceed with activity? Yes   HIPAA/medical authority confirmed? Yes   Relationship to patient of person spoken to? Self   Refill Screening Questions    Would patient like to speak to a pharmacist? No   When does the patient need to receive the medication? 06/25/23   Refill Delivery Questions    How will the patient receive the medication? MEDRx   When does the patient need to receive the medication? 06/25/23   Shipping Address Home   Address in [facility] confirmed and updated if neccessary? Yes   Expected Copay ($) 4.3   Is the patient able to afford the medication copay? Yes   Payment Method CC on file   Days supply of Refill 28   Supplies needed? No supplies needed   Refill activity completed? Yes   Refill activity plan Refill scheduled   Shipment/Pickup Date: 06/21/23            Current Outpatient Medications   Medication Sig    aspirin 81 MG Chew Take 1 tablet (81 mg total) by mouth once daily.    cilostazoL (PLETAL) 50 MG Tab Take 1 tablet (50 mg total) by mouth 2 (two) times daily.    evolocumab (REPATHA SYRINGE) 140 mg/mL Syrg Inject 140 mg into the skin every 14 (fourteen) days.    nicotine polacrilex 4 MG Lozg Take 1 lozenge (4 mg total) by mouth as needed (1-2 per hour in place of cigarettes).    sod picosulf-mag ox-citric ac (CLENPIQ) 10 mg-3.5 gram- 12 gram/160 mL Soln Take As Directed.   Last reviewed on 6/5/2023  4:39 PM by Hawa Barreto, RN    Review of patient's allergies indicates:  No Known Allergies Last reviewed on  6/5/2023 4:39 PM by Hawa Barreto      Tasks added this encounter   No tasks added.   Tasks due  within next 3 months   6/15/2023 - Refill Coordination Outreach (1 time occurrence)     [facility]  [address]  [facility] [address]  Phone: [phone]  Fax: [phone]

## 2023-06-28 ENCOUNTER — TELEPHONE (OUTPATIENT)
Dept: GASTROENTEROLOGY | Facility: CLINIC | Age: 68
End: 2023-06-28
Payer: MEDICARE

## 2023-06-28 NOTE — TELEPHONE ENCOUNTER
Patient rescheduled to August 7th. He stated he has the medication and the instructions and will have someone bring him and take him home.

## 2023-07-12 ENCOUNTER — SPECIALTY PHARMACY (OUTPATIENT)
Dept: PHARMACY | Facility: CLINIC | Age: 68
End: 2023-07-12
Payer: MEDICARE

## 2023-07-12 NOTE — TELEPHONE ENCOUNTER
Specialty Pharmacy - Refill Coordination    Specialty Medication Orders Linked to Encounter      Flowsheet Row Most Recent Value   Medication #1 evolocumab (REPATHA SYRINGE) 140 mg/mL Syrg (Order#[id number], Rx#[id number])            Refill Questions - Documented Responses      Flowsheet Row Most Recent Value   Patient Availability and HIPAA Verification    Does patient want to proceed with activity? Yes   HIPAA/medical authority confirmed? Yes   Relationship to patient of person spoken to? Self   Refill Screening Questions    Would patient like to speak to a pharmacist? No   When does the patient need to receive the medication? 07/16/23   Refill Delivery Questions    How will the patient receive the medication? [facility]   When does the patient need to receive the medication? 07/16/23   Shipping Address Home   Address in [facility] confirmed and updated if neccessary? Yes   Expected Copay ($) 4.3   Is the patient able to afford the medication copay? Yes   Payment Method CC on file   Days supply of Refill 28   Supplies needed? No supplies needed   Refill activity completed? No   Refill activity plan Refill scheduled   Shipment/Pickup Date: 07/13/23            Current Outpatient Medications   Medication Sig    aspirin 81 MG Chew Take 1 tablet (81 mg total) by mouth once daily.    cilostazoL (PLETAL) 50 MG Tab Take 1 tablet (50 mg total) by mouth 2 (two) times daily.    evolocumab (REPATHA SYRINGE) 140 mg/mL Syrg Inject 140 mg into the skin every 14 (fourteen) days.    nicotine polacrilex 4 MG Lozg Take 1 lozenge (4 mg total) by mouth as needed (1-2 per hour in place of cigarettes).    sod picosulf-mag ox-citric ac (CLENPIQ) 10 mg-3.5 gram- 12 gram/160 mL Soln Take As Directed.   Last reviewed on 6/5/2023  4:39 PM by aHwa Barreto, RN    Review of patient's allergies indicates:  No Known Allergies Last reviewed on  6/5/2023 4:39 PM by Hawa Barreto      Tasks added this encounter   No tasks added.   Tasks due  within next 3 months   7/12/2023 - Refill Coordination Outreach (1 time occurrence)     [facility]  [address]  [facility] [address]  Phone: [phone]  Fax: [phone]

## 2023-07-17 ENCOUNTER — TELEPHONE (OUTPATIENT)
Dept: PRIMARY CARE CLINIC | Facility: CLINIC | Age: 68
End: 2023-07-17
Payer: MEDICARE

## 2023-07-17 NOTE — TELEPHONE ENCOUNTER
----- Message from Carlos Tilley MD sent at 7/16/2023  4:28 PM CDT -----  Note sent from GI asking for colonoscopy heart clearance --test was scheduled early June I just got message Call see if pt still needs clearance if so make appt pt come in get seen

## 2023-07-19 NOTE — TELEPHONE ENCOUNTER
Called patient in regards to message. Patient said that he don't need an appointment. Patient said that he complete the Heart Ultrasound of the Aorta. He explained that he don't need to be seen before the procedure.

## 2023-08-03 ENCOUNTER — SPECIALTY PHARMACY (OUTPATIENT)
Dept: PHARMACY | Facility: CLINIC | Age: 68
End: 2023-08-03
Payer: MEDICARE

## 2023-08-03 NOTE — TELEPHONE ENCOUNTER
Specialty Pharmacy - Refill Coordination    Specialty Medication Orders Linked to Encounter      Flowsheet Row Most Recent Value   Medication #1 evolocumab (REPATHA SYRINGE) 140 mg/mL Syrg (Order#[id number], Rx#[id number])            Refill Questions - Documented Responses      Flowsheet Row Most Recent Value   Patient Availability and HIPAA Verification    Does patient want to proceed with activity? Yes   HIPAA/medical authority confirmed? Yes   Relationship to patient of person spoken to? Self   Refill Screening Questions    Would patient like to speak to a pharmacist? No   When does the patient need to receive the medication? 08/13/23   Refill Delivery Questions    When does the patient need to receive the medication? 08/13/23            Current Outpatient Medications   Medication Sig    aspirin 81 MG Chew Take 1 tablet (81 mg total) by mouth once daily.    cilostazoL (PLETAL) 50 MG Tab Take 1 tablet (50 mg total) by mouth 2 (two) times daily.    evolocumab (REPATHA SYRINGE) 140 mg/mL Syrg Inject 140 mg into the skin every 14 (fourteen) days.    nicotine polacrilex 4 MG Lozg Take 1 lozenge (4 mg total) by mouth as needed (1-2 per hour in place of cigarettes).    sod picosulf-mag ox-citric ac (CLENPIQ) 10 mg-3.5 gram- 12 gram/160 mL Soln Take As Directed.   Last reviewed on 7/28/2023  3:00 PM by Hawa Barreto, RN    Review of patient's allergies indicates:  No Known Allergies Last reviewed on  7/28/2023 3:00 PM by Hawa Barreto      Tasks added this encounter   No tasks added.   Tasks due within next 3 months   8/3/2023 - Refill Coordination Outreach (1 time occurrence)     [facility]  [address] [facility] [address]  Phone: [phone]  Fax: [phone]

## 2023-08-04 ENCOUNTER — DOCUMENTATION ONLY (OUTPATIENT)
Dept: SURGERY | Facility: CLINIC | Age: 68
End: 2023-08-04
Payer: MEDICARE

## 2023-08-04 NOTE — PROGRESS NOTES
Hawa Barreto, Damian Turner LPN; Yasmeen Jacobs MA  Cc: Fátima De Los Santos, CST  Caller: Unspecified (Yesterday,  2:41 PM)  Patient called and said he has to cancel his procedure, he also stated he could not reschedule at this time, he will call to reschedule at a later date.

## 2023-09-11 ENCOUNTER — PATIENT OUTREACH (OUTPATIENT)
Dept: ADMINISTRATIVE | Facility: HOSPITAL | Age: 68
End: 2023-09-11
Payer: MEDICARE

## 2023-09-11 NOTE — PROGRESS NOTES
Health Maintenance Due   Topic Date Due    COVID-19 Vaccine (1) Never done    Aspirin/Antiplatelet Therapy  Never done    Colorectal Cancer Screening  Never done    Shingles Vaccine (1 of 2) Never done    Influenza Vaccine (1) 09/01/2023        Chart review done.   HM updated.   Immunizations reviewed & updated.   Care Everywhere updated.

## 2023-09-18 ENCOUNTER — OFFICE VISIT (OUTPATIENT)
Dept: VASCULAR SURGERY | Facility: CLINIC | Age: 68
End: 2023-09-18
Attending: SURGERY
Payer: MEDICARE

## 2023-09-18 ENCOUNTER — HOSPITAL ENCOUNTER (OUTPATIENT)
Dept: VASCULAR SURGERY | Facility: CLINIC | Age: 68
Discharge: HOME | End: 2023-09-18
Attending: SURGERY
Payer: MEDICARE

## 2023-09-18 ENCOUNTER — PATIENT MESSAGE (OUTPATIENT)
Dept: PRIMARY CARE CLINIC | Facility: CLINIC | Age: 68
End: 2023-09-18
Payer: MEDICARE

## 2023-09-18 VITALS
DIASTOLIC BLOOD PRESSURE: 81 MMHG | TEMPERATURE: 98 F | WEIGHT: 160.94 LBS | SYSTOLIC BLOOD PRESSURE: 139 MMHG | HEIGHT: 65 IN | HEART RATE: 95 BPM | BODY MASS INDEX: 26.81 KG/M2

## 2023-09-18 DIAGNOSIS — I70.212 ATHEROSCLEROSIS OF NATIVE ARTERIES OF EXTREMITIES WITH INTERMITTENT CLAUDICATION, LEFT LEG: Primary | ICD-10-CM

## 2023-09-18 DIAGNOSIS — E78.5 HYPERLIPIDEMIA, UNSPECIFIED HYPERLIPIDEMIA TYPE: ICD-10-CM

## 2023-09-18 DIAGNOSIS — I73.9 PAD (PERIPHERAL ARTERY DISEASE): ICD-10-CM

## 2023-09-18 DIAGNOSIS — F17.210 NICOTINE DEPENDENCE, CIGARETTES, UNCOMPLICATED: ICD-10-CM

## 2023-09-18 PROCEDURE — 93923 UPR/LXTR ART STDY 3+ LVLS: CPT | Mod: PBBFAC | Performed by: SURGERY

## 2023-09-18 PROCEDURE — 93923 PR NON-INVASIVE PHYSIOLOGIC STUDY EXTREMITY 3 LEVELS: ICD-10-PCS | Mod: 26,S$PBB,, | Performed by: SURGERY

## 2023-09-18 PROCEDURE — 99999 PR PBB SHADOW E&M-EST. PATIENT-LVL III: ICD-10-PCS | Mod: PBBFAC,,, | Performed by: SURGERY

## 2023-09-18 PROCEDURE — 93923 UPR/LXTR ART STDY 3+ LVLS: CPT | Mod: 26,S$PBB,, | Performed by: SURGERY

## 2023-09-18 PROCEDURE — 99213 OFFICE O/P EST LOW 20 MIN: CPT | Mod: S$PBB,,, | Performed by: SURGERY

## 2023-09-18 PROCEDURE — 99213 PR OFFICE/OUTPT VISIT, EST, LEVL III, 20-29 MIN: ICD-10-PCS | Mod: S$PBB,,, | Performed by: SURGERY

## 2023-09-18 PROCEDURE — 99999 PR PBB SHADOW E&M-EST. PATIENT-LVL III: CPT | Mod: PBBFAC,,, | Performed by: SURGERY

## 2023-09-18 PROCEDURE — 99213 OFFICE O/P EST LOW 20 MIN: CPT | Mod: PBBFAC,25 | Performed by: SURGERY

## 2023-09-18 RX ORDER — CILOSTAZOL 50 MG/1
50 TABLET ORAL 2 TIMES DAILY
Qty: 60 TABLET | Refills: 11 | Status: SHIPPED | OUTPATIENT
Start: 2023-09-18 | End: 2024-09-17

## 2023-09-18 NOTE — PROGRESS NOTES
VASCULAR SURGERY NOTE    Patient ID: Elijah Love is a 67 y.o. male.    I. HISTORY     Chief Complaint: LLE claudication    HPI 9/18/23: He has been doing well since his last appointment.   6 blocks pain free walking distance. His claudication does not limit his day to day activities. He has been drinking water all day every day (even though he hates water) and only has iced tea every now and then. He is on repatha for his cholesterol and his LDL has gone down significantly. He is able to mow the lawn without stopping.     HPI 3/20/23:  Elijah Love is a 67 y.o. male who is here today for follow up appointment  for lower extremity claudication. States that his left lower extremity pain with walking is significantly improved and does not limit him in any way now.  Reports he was previously unable to ambulate half a city block but is now able to cut his grass and ambulate unassisted at the grocery store without having to rest due to pain. He thinks he can walk about 4 blocks before he has to stop due to pain in his left calf. He is walking regularly to try and improve his walking distance. He is currently smoking around 6 cigarettes daily after total smoking cessation for a few weeks.  He is actively participating in cessation efforts with nicotine patches and lozenges and indicates he is very motivated to quit.      See previous history and HPI's below.    HPI 12/19/22:  Patient reports that he is feeling well and that his claudication symptoms are continuing to improve. He notes that he is able to walk farther before his experiences leg pain. He was able to cut his grass for the first time in 3 months. He is continuing to drink 2L daily. He is now down to 1 cigarette daily and is motivated to quit completely. Is still attending [facility] smoking cessation program and using the patch.     HPI 10/24/22: Today he returns and states that his claudication symptoms have continued to improve.  He was able to walk  through [employer] and go shopping without stopping the other day.  He no longer has to take a scooter around the store.  He has been working hard to walk regularly and hydrate with at least 2 L of water per day.  He has also continued to take his cilostazol twice daily.  He has been able to cut down his smoking to 10 cigarettes per day which is significantly improved from 1.5ppd.  He continues with the [facility] smoking cessation program and has attended the classes regularly.  His goal is to quit smoking and he is wearing a patch to help him quit.  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 He realizes that he will have to change his behavior in order to cut out the remaining cigarettes and he will continue to work on this.  He was also able to see Dr. Mckeon and was prescribed Praluent to manage his hyperlipidemia since he is unable to swallow the statin tablets.  Thankfully he has been able to swallow the small cilostazol tablets.      HPI 9/16/22:  ...recent hospitalization on 9/3/22 for acute on chronic lower limb ischemia of his left leg. Prior to presentation at that time, he complained of claudication symptoms and was without any history of rest pain. He was treated with heparin gtt and fluid resuscitation in which he had improvement of symptoms. He is currently taking a chewable baby ASA daily. Due to inability to tolerate swallowing pills, he was not started on a statin.     Patient states he has been doing better since he was discharged. He complains of leg pain after walking about a block or so. States he started having calf and shin pain after walking from the garage to the hospital entrance. The pain resolves with rest. The quality of the pain is crampy. The pain starts on the calf first. Since discharge, patient reports he has kept his hydration up and only drinks water instead of tea now. He also has cut back smoking from 1.5 pack/day to only about 5 cigarettes/day!!! I  applauded him on his efforts and encouraged him to continue decreasing down to quitting.  States he is motivated to fully quit but is finding it hard to get through these last few cigarettes to completely quit.          Past Medical History:   Diagnosis Date    Kidney stones     Peripheral vascular disease, unspecified         Surgical History:  Tonsil/adenoidectomy      Social History     Occupational History    Not on file   Tobacco Use    Smoking status: Every Day     Current packs/day: 0.50     Types: Cigarettes    Smokeless tobacco: Never   Substance and Sexual Activity    Alcohol use: Yes     Comment: socially    Drug use: Never    Sexual activity: Yes       Review of Systems   Constitutional: Negative for weight loss.   HENT:  Negative for ear pain and nosebleeds.    Eyes:  Negative for discharge and pain.   Cardiovascular:  Negative for chest pain and palpitations.   Respiratory:  Negative for cough, shortness of breath and wheezing.    Endocrine: Negative for cold intolerance, heat intolerance and polyphagia.   Hematologic/Lymphatic: Negative for adenopathy. Does not bruise/bleed easily.   Skin:  Negative for itching and rash.   Musculoskeletal:  Negative for joint swelling and muscle cramps.   Gastrointestinal:  Negative for abdominal pain, diarrhea, nausea and vomiting.   Genitourinary:  Negative for dysuria and flank pain.   Neurological:  Negative for numbness and seizures.         II. PHYSICAL EXAM     Physical Exam  Constitutional:       General: He is not in acute distress.     Appearance: Normal appearance. He is not ill-appearing or diaphoretic.   HENT:      Head: Normocephalic and atraumatic.   Eyes:      General: No scleral icterus.        Right eye: No discharge.         Left eye: No discharge.      Extraocular Movements: Extraocular movements intact.      Conjunctiva/sclera: Conjunctivae normal.   Cardiovascular:      Rate and Rhythm: Normal rate and regular rhythm.      Comments: RLE: 2+  popliteal pulse  LLE: absent popliteal pulse  Pulmonary:      Effort: Pulmonary effort is normal. No respiratory distress.   Musculoskeletal:         General: Normal range of motion.      Cervical back: Normal range of motion and neck supple.      Right lower leg: No edema.      Left lower leg: No edema.   Skin:     General: Skin is warm and dry.      Capillary Refill: Capillary refill takes 2 to 3 seconds.      Coloration: Skin is not jaundiced or pale.      Findings: No erythema or rash.   Neurological:      General: No focal deficit present.      Mental Status: He is alert and oriented to person, place, and time. Mental status is at baseline.      Cranial Nerves: No cranial nerve deficit.   Psychiatric:         Mood and Affect: Mood normal.         Behavior: Behavior normal.       III. ASSESSMENT & PLAN (MEDICAL DECISION MAKING)     1. Atherosclerosis of native arteries of extremities with intermittent claudication, left leg    2. Nicotine dependence, cigarettes, uncomplicated    3. PAD (peripheral artery disease)    4. Hyperlipidemia, unspecified hyperlipidemia type                Imaging Results: (I have personally reviewed the images/studies and provided my interpretation below)    U/S 9/2: significant stenosis in distal SFA, popliteal artery and PT/AT.    ALESSANDRA 10/24/22:  RLE: 1.13  LLE: 0.58  Findings suggest no evidence of PAD on the right.  There is moderate to severe PAD on the left.      ALESSANDRA 9/18/23:  RLE: 1.14  LLE: 0.78    Assessment/Diagnosis and Plan:  67 y.o. male with LLE claudication.  Symptoms are no longer lifestyle limiting. Explained importance of smoking abstinence to improve durability of any surgical treatment if necessary in the future as well as to reduce risk of worsening PAD, cerebrovascular disease, coronary disease, COPD etc. patient expressed understanding and was in agreement with the treatment plan.    -Continue Cilostazol 50mg BID   -Walking program at least 4x/week for 30min per  session. Instructions provided  -Continue chewable ASA 81 mg daily. Recommended for all patients with PAD  -Continue to work on cutting smoking down until fully quit  -Continue repatha for high cholesterol  -Control of atherosclerotic risk factors: Goal LDL <100, Goal HbA1C <7, Goal BP <140/90  -Follow up in 6m for re-eval of symptoms    ALEXIS Granado II, MD, [facility]  Vascular Surgery  [facility] Granado II, MD, [facility]  Vascular Surgeon  [facility]

## 2023-09-20 ENCOUNTER — TELEPHONE (OUTPATIENT)
Dept: SMOKING CESSATION | Facility: CLINIC | Age: 68
End: 2023-09-20
Payer: MEDICARE

## 2023-11-15 ENCOUNTER — OFFICE VISIT (OUTPATIENT)
Dept: CARDIOLOGY | Facility: CLINIC | Age: 68
End: 2023-11-15
Payer: MEDICARE

## 2023-11-15 VITALS
WEIGHT: 158.75 LBS | HEIGHT: 65 IN | BODY MASS INDEX: 26.45 KG/M2 | SYSTOLIC BLOOD PRESSURE: 125 MMHG | DIASTOLIC BLOOD PRESSURE: 71 MMHG | HEART RATE: 95 BPM

## 2023-11-15 DIAGNOSIS — E78.49 OTHER HYPERLIPIDEMIA: Primary | ICD-10-CM

## 2023-11-15 DIAGNOSIS — Z72.0 TOBACCO ABUSE: ICD-10-CM

## 2023-11-15 DIAGNOSIS — M79.662 PAIN IN LEFT LOWER LEG: ICD-10-CM

## 2023-11-15 DIAGNOSIS — I73.9 PAD (PERIPHERAL ARTERY DISEASE): ICD-10-CM

## 2023-11-15 DIAGNOSIS — I99.8 LOWER LIMB ISCHEMIA: ICD-10-CM

## 2023-11-15 DIAGNOSIS — Z78.9 STATIN INTOLERANCE: ICD-10-CM

## 2023-11-15 PROCEDURE — 1101F PT FALLS ASSESS-DOCD LE1/YR: CPT | Mod: HCNC,CPTII,S$GLB, | Performed by: INTERNAL MEDICINE

## 2023-11-15 PROCEDURE — 3008F BODY MASS INDEX DOCD: CPT | Mod: HCNC,CPTII,S$GLB, | Performed by: INTERNAL MEDICINE

## 2023-11-15 PROCEDURE — 3288F FALL RISK ASSESSMENT DOCD: CPT | Mod: HCNC,CPTII,S$GLB, | Performed by: INTERNAL MEDICINE

## 2023-11-15 PROCEDURE — 3288F PR FALLS RISK ASSESSMENT DOCUMENTED: ICD-10-PCS | Mod: HCNC,CPTII,S$GLB, | Performed by: INTERNAL MEDICINE

## 2023-11-15 PROCEDURE — 3008F PR BODY MASS INDEX (BMI) DOCUMENTED: ICD-10-PCS | Mod: HCNC,CPTII,S$GLB, | Performed by: INTERNAL MEDICINE

## 2023-11-15 PROCEDURE — 1126F PR PAIN SEVERITY QUANTIFIED, NO PAIN PRESENT: ICD-10-PCS | Mod: HCNC,CPTII,S$GLB, | Performed by: INTERNAL MEDICINE

## 2023-11-15 PROCEDURE — 3078F PR MOST RECENT DIASTOLIC BLOOD PRESSURE < 80 MM HG: ICD-10-PCS | Mod: HCNC,CPTII,S$GLB, | Performed by: INTERNAL MEDICINE

## 2023-11-15 PROCEDURE — 3044F PR MOST RECENT HEMOGLOBIN A1C LEVEL <7.0%: ICD-10-PCS | Mod: HCNC,CPTII,S$GLB, | Performed by: INTERNAL MEDICINE

## 2023-11-15 PROCEDURE — 1159F PR MEDICATION LIST DOCUMENTED IN MEDICAL RECORD: ICD-10-PCS | Mod: HCNC,CPTII,S$GLB, | Performed by: INTERNAL MEDICINE

## 2023-11-15 PROCEDURE — 1126F AMNT PAIN NOTED NONE PRSNT: CPT | Mod: HCNC,CPTII,S$GLB, | Performed by: INTERNAL MEDICINE

## 2023-11-15 PROCEDURE — 3044F HG A1C LEVEL LT 7.0%: CPT | Mod: HCNC,CPTII,S$GLB, | Performed by: INTERNAL MEDICINE

## 2023-11-15 PROCEDURE — 1101F PR PT FALLS ASSESS DOC 0-1 FALLS W/OUT INJ PAST YR: ICD-10-PCS | Mod: HCNC,CPTII,S$GLB, | Performed by: INTERNAL MEDICINE

## 2023-11-15 PROCEDURE — 1159F MED LIST DOCD IN RCRD: CPT | Mod: HCNC,CPTII,S$GLB, | Performed by: INTERNAL MEDICINE

## 2023-11-15 PROCEDURE — 99215 PR OFFICE/OUTPT VISIT, EST, LEVL V, 40-54 MIN: ICD-10-PCS | Mod: HCNC,S$GLB,, | Performed by: INTERNAL MEDICINE

## 2023-11-15 PROCEDURE — 3074F PR MOST RECENT SYSTOLIC BLOOD PRESSURE < 130 MM HG: ICD-10-PCS | Mod: HCNC,CPTII,S$GLB, | Performed by: INTERNAL MEDICINE

## 2023-11-15 PROCEDURE — 3074F SYST BP LT 130 MM HG: CPT | Mod: HCNC,CPTII,S$GLB, | Performed by: INTERNAL MEDICINE

## 2023-11-15 PROCEDURE — 99215 OFFICE O/P EST HI 40 MIN: CPT | Mod: HCNC,S$GLB,, | Performed by: INTERNAL MEDICINE

## 2023-11-15 PROCEDURE — 3078F DIAST BP <80 MM HG: CPT | Mod: HCNC,CPTII,S$GLB, | Performed by: INTERNAL MEDICINE

## 2023-11-15 PROCEDURE — 99999 PR PBB SHADOW E&M-EST. PATIENT-LVL III: CPT | Mod: PBBFAC,HCNC,, | Performed by: INTERNAL MEDICINE

## 2023-11-15 PROCEDURE — 99999 PR PBB SHADOW E&M-EST. PATIENT-LVL III: ICD-10-PCS | Mod: PBBFAC,HCNC,, | Performed by: INTERNAL MEDICINE

## 2023-11-15 NOTE — PATIENT INSTRUCTIONS
Assessment/Plan:  Elijah Love is a 67 y.o. male with PAD s/p hospitalization for acute on chronic lower limb ischemia of his left leg (9/2/2022), HLD, smoking, overweight, who presents for a follow up appointment.     Hyperlipidemia-  LDL 85 on 1/30/2023 vs 154 on 2/1/2021.  Continue repatha 140 mg subq every 14 days.         2. PAD- Mr. Love reports improvement in LLE claudication symptoms since hospital discharge on 9/3/2022.  He has no rest pain or tissue loss.  Continue medical management of PAD with cilostazol 100 mg bid and ASA 81 mg daily.  Continue walking program with goal of at least 30 minutes a day/5 days a week. ALESSANDRA on 9/19/2023 revealed 1.14 right, 0.78 on left. US Abdominal aorta on 4/19/2023 shows no abdominal aortic aneurysm     3. Smoking- Following smoking cessation. Reduced smoking compared to previous [ 5 cigarettes currently compared to 30 previously]    4. Overweight- Encourage diet, exercise, and weight loss.    Follow up in 6 months

## 2023-11-15 NOTE — PROGRESS NOTES
[facility]      Chief Complaint   Patient presents with    PAD (peripheral artery disease0    Hyperlipidemia, unspecified type       Patient ID: Elijah Love is a 68 y.o. male with PAD s/p hospitalization for acute on chronic lower limb ischemia of his left leg (9/2/2022), HLD, smoking, overweight, who presents for a follow up appointment.  Pertinent history/events are as follows:     -Pt kindly referred by Dr. Granado for evaluation of hyperlipidemia.    -At our initial clinic visit on 9/28/2022, Mr. Love reported improvement in LLE claudication symptoms since hospital discharge.  He has no rest pain or tissue loss.  Smokes 1 pack a day for total of 50 years.  States he now participating in smoking cessation.  Labs from 2/1/2021 show total cholesterol 264 with  .  Mr. Love states he was unable to tolerate statins and fenofibrate due to issues with the size of the pills.    Plan:   Hyperlipidemia- Labs from 2/1/2021 show total cholesterol 264 with  .  Mr. Love states he was unable to tolerate statins and fenofibrate due to issues with the size of the pills.  Start bempedoic acid 180 mg daily.     PAD- Mr. Love reports improvement in LLE claudication symptoms since hospital discharge on 9/3/2022.  He has no rest pain or tissue loss.  Continue medical management of PAD with cilostazol 100 mg bid and ASA 81 mg daily.  Pt to start walking program with goal of at least 30 minutes a day/5 days a week.  Smoking- Encourage smoking cessation.  Overweight- Encourage diet, exercise, and weight loss.    - At clinic visit on  2/6/2023 Mr. Love reports no chest pain, SOB, LE edema, claudication, rest pain, or tissue loss.  LDL 85 on 1/30/2023 vs 154 on 2/1/2021.    Plan:  Hyperlipidemia-  LDL 85 on 1/30/2023 vs 154 on 2/1/2021.  Continue repatha 140 mg subq every 14 days.       PAD- Mr. Love reports improvement in LLE claudication symptoms since hospital discharge on  9/3/2022.  He has no rest pain or tissue loss.  Continue medical management of PAD with cilostazol 100 mg bid and ASA 81 mg daily.  Continue walking program with goal of at least 30 minutes a day/5 days a week.  Smoking- Encourage smoking cessation.  Overweight- Encourage diet, exercise, and weight loss.  Follow up in 6 months with lipids and cmp prior     HPI:  Mr. Love reports no chest pain, SOB, LE edema, claudication, rest pain, or tissue loss.  LDL 38 on 11/8/2023 vs 85 on 1/30/2023 vs 154 on 2/1/2021. Reduced smoking compared to previous [ 5 cigarettes currently compared to 30 previously]  He reports he has started walking regularly. ALESSANDRA on 9/19/2023 revealed 1.14 right, 0.78 on left. US Abdominal aorta on 4/19/2023 shows no abdominal aortic aneurysm     Past Medical History:   Diagnosis Date    Kidney stones     Peripheral vascular disease, unspecified      No past surgical history on file.  Social History     Socioeconomic History    Marital status: Single   Tobacco Use    Smoking status: Every Day     Current packs/day: 0.50     Types: Cigarettes    Smokeless tobacco: Never   Substance and Sexual Activity    Alcohol use: Yes     Comment: socially    Drug use: Never    Sexual activity: Yes     No family history on file.    Review of patient's allergies indicates:  No Known Allergies    Medication List with Changes/Refills   Current Medications    ASPIRIN 81 MG CHEW    Take 1 tablet (81 mg total) by mouth once daily.    CILOSTAZOL (PLETAL) 50 MG TAB    Take 1 tablet (50 mg total) by mouth 2 (two) times daily.    EVOLOCUMAB (REPATHA SYRINGE) 140 MG/ML SYRG    Inject 140 mg into the skin every 14 (fourteen) days.    NICOTINE POLACRILEX 4 MG LOZG    Take 1 lozenge (4 mg total) by mouth as needed (1-2 per hour in place of cigarettes).       Review of Systems  Constitution: Denies chills, fever, and sweats.  HENT: Denies headaches or blurry vision.  Cardiovascular: Denies chest pain or irregular heart  "beat.  Respiratory: Denies cough or shortness of breath.  Gastrointestinal: Denies abdominal pain, nausea, or vomiting.  Musculoskeletal: No muscle cramps, pain  Neurological: Denies dizziness or focal weakness.  Psychiatric/Behavioral: Normal mental status.  Hematologic/Lymphatic: Denies bleeding problem or easy bruising/bleeding.  Skin: Denies rash or suspicious lesions    Physical Examination  /71   Pulse 95   Ht 5' 5" (1.651 m)   Wt 72 kg (158 lb 11.7 oz)   BMI 26.41 kg/m²     Constitutional: No acute distress, conversant  HEENT: Sclera anicteric, Pupils equal, round and reactive to light, extraocular motions intact, Oropharynx clear  Neck: No JVD, no carotid bruits  Cardiovascular: regular rate and rhythm, no murmur, rubs or gallops, normal S1/S2  Pulmonary: Clear to auscultation bilaterally  Abdominal: Abdomen soft, nontender, nondistended, positive bowel sounds  Extremities: No lower extremity edema   Pulses:  Carotid pulses are 2+ on the right side, and 2+ on the left side.  Radial pulses are 2+ on the right side, and 2+ on the left side.   Femoral pulses are 2+ on the right side, and 2+ on the left side.  Popliteal pulses are 2+ on the right side, and 2+ on the left side.   Dorsalis pedis pulses are 2+ on the right side, and 0 on the left side.   Posterior tibial pulses are 2+ on the right side, and 0 on the left side.    Skin: No ecchymosis, erythema, or ulcers  Psych: Alert and oriented x 3, appropriate affect  Neuro: CNII-XII intact, no focal deficits    Labs:  Most Recent Data  CBC:   Lab Results   Component Value Date    WBC 9.30 03/21/2023    HGB 16.2 03/21/2023    HCT 49.3 03/21/2023     03/21/2023    MCV 91 03/21/2023    RDW 14.2 03/21/2023     BMP:   Lab Results   Component Value Date     03/21/2023    K 4.1 03/21/2023     03/21/2023    CO2 23 03/21/2023    BUN 19 03/21/2023    CREATININE 1.2 03/21/2023     (H) 03/21/2023    CALCIUM 10.1 03/21/2023     LFTS; " "  Lab Results   Component Value Date    PROT 8.0 03/21/2023    ALBUMIN 4.3 03/21/2023    BILITOT 0.5 03/21/2023    AST 18 03/21/2023    ALKPHOS 76 03/21/2023    ALT 12 03/21/2023     COAGS:   Lab Results   Component Value Date    INR 1.1 09/02/2022     FLP:   Lab Results   Component Value Date    CHOL 125 11/08/2023    HDL 51 11/08/2023    LDLCALC 38.0 (L) 11/08/2023    TRIG 180 (H) 11/08/2023    CHOLHDL 40.8 11/08/2023     CARDIAC: No results found for: "TROPONINI", "CKTOTAL", "CKMB", "BNP"    Imaging:    US Abdominal aorta 4/19/2023  No abdominal aortic aneurysm     LLE Arterial Ultrasound 9/2/2022:  Significant stenosis in the distal SFA, popliteal artery, and calf arteries with monophasic dampened waveform and sluggish velocities.  Findings are suggestive of advanced atherosclerosis or thromboembolic process.  Suggest correlation with physical exam and vascular surgery evaluation as appropriate.   Nonvisualization of the left dorsalis pedis artery.    Assessment/Plan:  Elijah Love is a 67 y.o. male with PAD s/p hospitalization for acute on chronic lower limb ischemia of his left leg (9/2/2022), HLD, smoking, overweight, who presents for a follow up appointment.     Hyperlipidemia-  LDL 85 on 1/30/2023 vs 154 on 2/1/2021.  Continue repatha 140 mg subq every 14 days.         2. PAD- Mr. Love reports improvement in LLE claudication symptoms since hospital discharge on 9/3/2022.  He has no rest pain or tissue loss.  Continue medical management of PAD with cilostazol 100 mg bid and ASA 81 mg daily.  Continue walking program with goal of at least 30 minutes a day/5 days a week. ALESSANDRA on 9/19/2023 revealed 1.14 right, 0.78 on left. US Abdominal aorta on 4/19/2023 shows no abdominal aortic aneurysm     3. Smoking- Following smoking cessation. Reduced smoking compared to previous [ 5 cigarettes currently compared to 30 previously]    4. Overweight- Encourage diet, exercise, and weight loss.    Follow up in 6 " months    Total duration of face to face visit time 30 minutes.  Total time spent counseling greater than fifty percent of total visit time.  Counseling included discussion regarding imaging findings, diagnosis, possibilities, treatment options, risks and benefits.  The patient had many questions regarding the options and long-term effects.    Patient seen and discussed with Dr. Mckeon. Further recommendations per the attending addendum.    Juvenal Rocha MD  PGY IV - Vascular Medicine Fellow   [facility]

## 2024-01-02 ENCOUNTER — PATIENT MESSAGE (OUTPATIENT)
Dept: ADMINISTRATIVE | Facility: CLINIC | Age: 69
End: 2024-01-02
Payer: MEDICARE

## 2024-01-02 ENCOUNTER — TELEPHONE (OUTPATIENT)
Dept: ADMINISTRATIVE | Facility: CLINIC | Age: 69
End: 2024-01-02
Payer: MEDICARE

## 2024-01-03 ENCOUNTER — TELEPHONE (OUTPATIENT)
Dept: ADMINISTRATIVE | Facility: CLINIC | Age: 69
End: 2024-01-03
Payer: MEDICARE

## 2024-01-03 ENCOUNTER — OFFICE VISIT (OUTPATIENT)
Dept: HOME HEALTH SERVICES | Facility: CLINIC | Age: 69
End: 2024-01-03
Payer: MEDICARE

## 2024-01-03 VITALS — BODY MASS INDEX: 26.66 KG/M2 | HEIGHT: 65 IN | WEIGHT: 160 LBS

## 2024-01-03 DIAGNOSIS — Z00.00 ENCOUNTER FOR PREVENTIVE HEALTH EXAMINATION: Primary | ICD-10-CM

## 2024-01-03 DIAGNOSIS — Z72.0 TOBACCO ABUSE: ICD-10-CM

## 2024-01-03 DIAGNOSIS — E78.49 OTHER HYPERLIPIDEMIA: ICD-10-CM

## 2024-01-03 DIAGNOSIS — I73.9 PAD (PERIPHERAL ARTERY DISEASE): ICD-10-CM

## 2024-01-03 PROCEDURE — G0439 PPPS, SUBSEQ VISIT: HCPCS | Mod: 95,,, | Performed by: NURSE PRACTITIONER

## 2024-01-03 PROCEDURE — 3288F FALL RISK ASSESSMENT DOCD: CPT | Mod: CPTII,95,, | Performed by: NURSE PRACTITIONER

## 2024-01-03 PROCEDURE — 1159F MED LIST DOCD IN RCRD: CPT | Mod: CPTII,95,, | Performed by: NURSE PRACTITIONER

## 2024-01-03 PROCEDURE — 1126F AMNT PAIN NOTED NONE PRSNT: CPT | Mod: CPTII,95,, | Performed by: NURSE PRACTITIONER

## 2024-01-03 PROCEDURE — G9919 SCRN ND POS ND PROV OF REC: HCPCS | Mod: CPTII,NDTC,, | Performed by: NURSE PRACTITIONER

## 2024-01-03 PROCEDURE — 1160F RVW MEDS BY RX/DR IN RCRD: CPT | Mod: CPTII,95,, | Performed by: NURSE PRACTITIONER

## 2024-01-03 PROCEDURE — 1101F PT FALLS ASSESS-DOCD LE1/YR: CPT | Mod: CPTII,95,, | Performed by: NURSE PRACTITIONER

## 2024-01-03 NOTE — PROGRESS NOTES
"The patient location is: [place]  The chief complaint leading to consultation is: Health Risk Assessment    Visit type: audiovisual    Face to Face time with patient: 20  40 minutes of total time spent on the encounter, which includes face to face time and non-face to face time preparing to see the patient (eg, review of tests), Obtaining and/or reviewing separately obtained history, Documenting clinical information in the electronic or other health record, Independently interpreting results (not separately reported) and communicating results to the patient/family/caregiver, or Care coordination (not separately reported).         Each patient to whom he or she provides medical services by telemedicine is:  (1) informed of the relationship between the physician and patient and the respective role of any other health care provider with respect to management of the patient; and (2) notified that he or she may decline to receive medical services by telemedicine and may withdraw from such care at any time.    Notes:       Elijah Love presented for a  Medicare AWV and comprehensive Health Risk Assessment today. The following components were reviewed and updated:    Medical history  Family History  Social history  Allergies and Current Medications  Health Risk Assessment  Health Maintenance  Care Team     Patient screened moderate and/or high risk for one or more social determinants of health (SDOH). Patient connected to community resources through the ED Navigator.      ** See Completed Assessments for Annual Wellness Visit within the encounter summary.**         The following assessments were completed:  Living Situation  CAGE  Depression Screening  Fall Risk Assessment (MACH 10)  Hearing Assessment(HHI)  Cognitive Function Screening  Nutrition Screening  ADL Screening  PAQ Screening      Vitals:    01/03/24 0859   Weight: 72.6 kg (160 lb)   Height: 5' 5" (1.651 m)     Body mass index is 26.63 kg/m².  Physical " Exam  Constitutional:       Appearance: Normal appearance.   Neurological:      General: No focal deficit present.      Mental Status: He is alert and oriented to person, place, and time.               Diagnoses and health risks identified today and associated recommendations/orders:    1. Encounter for preventive health examination  Assessments completed. Preventive measures and health maintenance reviewed with patient.  Review for opioid screening: Patient does not have any prescriptions for opioids.    2. PAD (peripheral artery disease)  Stable, patient on Pletal. Followed by Cardiology.    3. Other hyperlipidemia  Stable, patient on Repatha injections. Followed by PCP and Cardiology.    4. Tobacco abuse  Stable, patient on Nicotine lozenges as needed. Reports he's cut down to 8 to 10 cigarettes a day. Followed by PCP and Cardiology.      Provided Elijah with a 5-10 year written screening schedule and personal prevention plan. Recommendations were developed using the USPSTF age appropriate recommendations. Education, counseling, and referrals were provided as needed. After Visit Summary printed and given to patient which includes a list of additional screenings\tests needed.    Follow up in about 1 year (around 1/3/2025) for your next annual wellness visit.    Chichi More, VIDAL  I offered to discuss advanced care planning, including how to pick a person who would make decisions for you if you were unable to make them for yourself, called a health care power of , and what kind of decisions you might make such as use of life sustaining treatments such as ventilators and tube feeding when faced with a life limiting illness recorded on a living will that they will need to know. (How you want to be cared for as you near the end of your natural life)     X Patient is interested in learning more about how to make advanced directives.  I provided them paperwork and offered to discuss this with them.

## 2024-01-03 NOTE — PATIENT INSTRUCTIONS
Counseling and Referral of Other Preventative  (Italic type indicates deductible and co-insurance are waived)    Patient Name: Elijah Love  Today's Date: 1/3/2024    Health Maintenance       Date Due Completion Date    COVID-19 Vaccine (1) Never done ---    Aspirin/Antiplatelet Therapy Never done ---    High Dose Statin Never done ---    Colorectal Cancer Screening Never done ---    Shingles Vaccine (1 of 2) Never done ---    RSV Vaccine (Age 60+ and Pregnant patients) (1 - 1-dose 60+ series) Never done ---    Influenza Vaccine (1) 09/01/2023 1/4/2022    Override on 10/14/2020: Done    Override on 11/4/2019: Done    Pneumococcal Vaccines (Age 65+) (2 - PCV) 01/30/2024 (Originally 2/1/2022) 2/1/2021    Hemoglobin A1c (Diabetic Prevention Screening) 03/21/2026 3/21/2023    Lipid Panel 11/08/2028 11/8/2023    TETANUS VACCINE 10/20/2030 10/20/2020        No orders of the defined types were placed in this encounter.      The following information is provided to all patients.  This information is to help you find resources for any of the problems found today that may be affecting your health:                  Living healthy guide: www.[facility].[place].gov      Understanding Diabetes: www.diabetes.org      Eating healthy: www.cdc.gov/healthyweight      [facility] home safety checklist: www.cdc.gov/steadi/patient.html      Agency on Aging: www.goea.[place].[facility]      Alcoholics anonymous (AA): www.aa.org      Physical Activity: www.[place].nih.gov/de8inao      Tobacco use: www.quitwithusla.org

## 2024-01-08 ENCOUNTER — PATIENT MESSAGE (OUTPATIENT)
Dept: FAMILY MEDICINE | Facility: CLINIC | Age: 69
End: 2024-01-08
Payer: MEDICARE

## 2024-03-21 ENCOUNTER — OFFICE VISIT (OUTPATIENT)
Dept: VASCULAR SURGERY | Facility: CLINIC | Age: 69
End: 2024-03-21
Attending: SURGERY
Payer: MEDICARE

## 2024-03-21 VITALS
HEIGHT: 65 IN | HEART RATE: 92 BPM | TEMPERATURE: 98 F | WEIGHT: 158.75 LBS | DIASTOLIC BLOOD PRESSURE: 67 MMHG | SYSTOLIC BLOOD PRESSURE: 112 MMHG | BODY MASS INDEX: 26.45 KG/M2

## 2024-03-21 DIAGNOSIS — I70.212 ATHEROSCLEROSIS OF NATIVE ARTERIES OF EXTREMITIES WITH INTERMITTENT CLAUDICATION, LEFT LEG: Primary | ICD-10-CM

## 2024-03-21 DIAGNOSIS — F17.210 NICOTINE DEPENDENCE, CIGARETTES, UNCOMPLICATED: ICD-10-CM

## 2024-03-21 DIAGNOSIS — E78.5 HYPERLIPIDEMIA, UNSPECIFIED HYPERLIPIDEMIA TYPE: ICD-10-CM

## 2024-03-21 DIAGNOSIS — I73.9 PAD (PERIPHERAL ARTERY DISEASE): ICD-10-CM

## 2024-03-21 PROCEDURE — 3288F FALL RISK ASSESSMENT DOCD: CPT | Mod: HCNC,CPTII,S$GLB, | Performed by: SURGERY

## 2024-03-21 PROCEDURE — 3008F BODY MASS INDEX DOCD: CPT | Mod: HCNC,CPTII,S$GLB, | Performed by: SURGERY

## 2024-03-21 PROCEDURE — 3074F SYST BP LT 130 MM HG: CPT | Mod: HCNC,CPTII,S$GLB, | Performed by: SURGERY

## 2024-03-21 PROCEDURE — 1101F PT FALLS ASSESS-DOCD LE1/YR: CPT | Mod: HCNC,CPTII,S$GLB, | Performed by: SURGERY

## 2024-03-21 PROCEDURE — 1126F AMNT PAIN NOTED NONE PRSNT: CPT | Mod: HCNC,CPTII,S$GLB, | Performed by: SURGERY

## 2024-03-21 PROCEDURE — 99213 OFFICE O/P EST LOW 20 MIN: CPT | Mod: HCNC,S$GLB,, | Performed by: SURGERY

## 2024-03-21 PROCEDURE — 1159F MED LIST DOCD IN RCRD: CPT | Mod: HCNC,CPTII,S$GLB, | Performed by: SURGERY

## 2024-03-21 PROCEDURE — 3078F DIAST BP <80 MM HG: CPT | Mod: HCNC,CPTII,S$GLB, | Performed by: SURGERY

## 2024-03-21 PROCEDURE — 99999 PR PBB SHADOW E&M-EST. PATIENT-LVL III: CPT | Mod: PBBFAC,HCNC,, | Performed by: SURGERY

## 2024-03-21 NOTE — PROGRESS NOTES
VASCULAR SURGERY NOTE    Patient ID: Elijah Love is a 68 y.o. male.    I. HISTORY     Chief Complaint: LLE claudication    HPI 3/21/24:  Patient reports great improvements to bilateral lower extremity claudication pain.  Patient says mild pain still remains but is more tolerable.  He is now able to move his grass.  He says [place] [facility] [place] is no longer painful and exhausting.  He reports going to smoking cessation classes and is smoking about 8 cigarettes a day.  He was wrestling a laid off and has more free time to walk.  He walks everyday.  He is drinking more water.  Patient overall feeling well.  Denies fevers, chills, nausea, or vomiting.  Denies shortness of breath or chest pain.  Denies any new complaints.    HPI 9/18/23: He has been doing well since his last appointment.   6 blocks pain free walking distance. His claudication does not limit his day to day activities. He has been drinking water all day every day (even though he hates water) and only has iced tea every now and then. He is on repatha for his cholesterol and his LDL has gone down significantly. He is able to mow the lawn without stopping.     HPI 3/20/23:  Elijah Love is a 68 y.o. male who is here today for follow up appointment  for lower extremity claudication. States that his left lower extremity pain with walking is significantly improved and does not limit him in any way now.  Reports he was previously unable to ambulate half a city block but is now able to cut his grass and ambulate unassisted at the grocery store without having to rest due to pain. He thinks he can walk about 4 blocks before he has to stop due to pain in his left calf. He is walking regularly to try and improve his walking distance. He is currently smoking around 6 cigarettes daily after total smoking cessation for a few weeks.  He is actively participating in cessation efforts with nicotine patches and lozenges and  indicates he is very motivated to quit.      HPI 10/24/22: Today he returns and states that his claudication symptoms have continued to improve.  He was able to walk through [employer] and go shopping without stopping the other day.  He no longer has to take a scooter around the store.  He has been working hard to walk regularly and hydrate with at least 2 L of water per day.  He has also continued to take his cilostazol twice daily.  He has been able to cut down his smoking to 10 cigarettes per day which is significantly improved from 1.5ppd.  He continues with the [facility] smoking cessation program and has attended the classes regularly.  His goal is to quit smoking and he is wearing a patch to help him quit.  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 He realizes that he will have to change his behavior in order to cut out the remaining cigarettes and he will continue to work on this.  He was also able to see Dr. Mckeon and was prescribed Praluent to manage his hyperlipidemia since he is unable to swallow the statin tablets.  Thankfully he has been able to swallow the small cilostazol tablets.    HPI 9/16/22:  ...recent hospitalization on 9/3/22 for acute on chronic lower limb ischemia of his left leg. Prior to presentation at that time, he complained of claudication symptoms and was without any history of rest pain. He was treated with heparin gtt and fluid resuscitation in which he had improvement of symptoms. He is currently taking a chewable baby ASA daily. Due to inability to tolerate swallowing pills, he was not started on a statin.     Patient states he has been doing better since he was discharged. He complains of leg pain after walking about a block or so. States he started having calf and shin pain after walking from the garage to the hospital entrance. The pain resolves with rest. The quality of the pain is crampy. The pain starts on the calf first. Since discharge, patient  reports he has kept his hydration up and only drinks water instead of tea now. He also has cut back smoking from 1.5 pack/day to only about 5 cigarettes/day!!! I applauded him on his efforts and encouraged him to continue decreasing down to quitting.  States he is motivated to fully quit but is finding it hard to get through these last few cigarettes to completely quit.    Past Medical History:   Diagnosis Date    Kidney stones     Peripheral vascular disease, unspecified       Surgical History:  Tonsil/adenoidectomy    Social History     Occupational History    Not on file   Tobacco Use    Smoking status: Every Day     Current packs/day: 0.50     Types: Cigarettes    Smokeless tobacco: Never    Tobacco comments:     Currently down to 8-10 cigarettes a day. Has Nicotine Lozenges as needed.   Substance and Sexual Activity    Alcohol use: Yes     Comment: socially    Drug use: Never    Sexual activity: Yes     Review of Systems   Constitutional: Negative for weight loss.   HENT:  Negative for ear pain and nosebleeds.    Eyes:  Negative for discharge and pain.   Cardiovascular:  Negative for chest pain and palpitations.   Respiratory:  Negative for cough, shortness of breath and wheezing.    Endocrine: Negative for cold intolerance, heat intolerance and polyphagia.   Hematologic/Lymphatic: Negative for adenopathy. Does not bruise/bleed easily.   Skin:  Negative for itching and rash.   Musculoskeletal:  Negative for joint swelling and muscle cramps.   Gastrointestinal:  Negative for abdominal pain, diarrhea, nausea and vomiting.   Genitourinary:  Negative for dysuria and flank pain.   Neurological:  Negative for numbness and seizures.     II. PHYSICAL EXAM     Physical Exam  Constitutional:       General: He is not in acute distress.     Appearance: Normal appearance. He is not ill-appearing or diaphoretic.   HENT:      Head: Normocephalic and atraumatic.   Eyes:      General: No scleral icterus.        Right eye: No  discharge.         Left eye: No discharge.      Extraocular Movements: Extraocular movements intact.      Conjunctiva/sclera: Conjunctivae normal.   Cardiovascular:      Rate and Rhythm: Normal rate and regular rhythm.      Comments: RLE: 2+ popliteal pulse  LLE: absent popliteal pulse  Pulmonary:      Effort: Pulmonary effort is normal. No respiratory distress.   Musculoskeletal:         General: Normal range of motion.      Cervical back: Normal range of motion and neck supple.      Right lower leg: No edema.      Left lower leg: No edema.   Skin:     General: Skin is warm and dry.      Capillary Refill: Capillary refill takes 2 to 3 seconds.      Coloration: Skin is not jaundiced or pale.      Findings: No erythema or rash.   Neurological:      General: No focal deficit present.      Mental Status: He is alert and oriented to person, place, and time. Mental status is at baseline.      Cranial Nerves: No cranial nerve deficit.   Psychiatric:         Mood and Affect: Mood normal.         Behavior: Behavior normal.       III. ASSESSMENT & PLAN (MEDICAL DECISION MAKING)     1. Atherosclerosis of native arteries of extremities with intermittent claudication, left leg    2. Nicotine dependence, cigarettes, uncomplicated    3. Hyperlipidemia, unspecified hyperlipidemia type    4. PAD (peripheral artery disease)      Imaging Results: (I have personally reviewed the images/studies and provided my interpretation below)    U/S 9/2: significant stenosis in distal SFA, popliteal artery and PT/AT.    ALESSANDRA 10/24/22:  RLE: 1.13  LLE: 0.58  Findings suggest no evidence of PAD on the right.  There is moderate to severe PAD on the left.    ALESSANDRA 9/18/23:  RLE: 1.14  LLE: 0.78    Assessment/Diagnosis and Plan:  68 y.o. male with LLE claudication.  Symptoms improving. Explained importance of smoking abstinence to improve durability of any surgical treatment if necessary in the future as well as to reduce risk of worsening PAD,  cerebrovascular disease, coronary disease, COPD etc. Patient expressed understanding and was in agreement with the treatment plan.    -Continue Cilostazol 50mg BID   -Walking program at least 4x/week for 30min per session. Instructions provided  -Continue chewable ASA 81 mg daily. Recommended for all patients with PAD  -Continue to work on cutting smoking down until fully quit  -Continue repatha for high cholesterol  -Control of atherosclerotic risk factors: Goal LDL <100, Goal HbA1C <7, Goal BP <140/90  -Follow up in 6m for re-eval of symptoms, no studies    Viktor Burdick DPM   Vascular Surgery  [facility]

## 2024-06-14 ENCOUNTER — OFFICE VISIT (OUTPATIENT)
Dept: CARDIOLOGY | Facility: CLINIC | Age: 69
End: 2024-06-14
Payer: MEDICARE

## 2024-06-14 VITALS
SYSTOLIC BLOOD PRESSURE: 108 MMHG | HEIGHT: 65 IN | DIASTOLIC BLOOD PRESSURE: 64 MMHG | WEIGHT: 158.94 LBS | BODY MASS INDEX: 26.48 KG/M2 | HEART RATE: 102 BPM

## 2024-06-14 DIAGNOSIS — I73.9 PAD (PERIPHERAL ARTERY DISEASE): Primary | ICD-10-CM

## 2024-06-14 DIAGNOSIS — Z72.0 TOBACCO ABUSE: ICD-10-CM

## 2024-06-14 DIAGNOSIS — Z78.9 STATIN INTOLERANCE: ICD-10-CM

## 2024-06-14 DIAGNOSIS — E78.49 OTHER HYPERLIPIDEMIA: ICD-10-CM

## 2024-06-14 PROCEDURE — 3078F DIAST BP <80 MM HG: CPT | Mod: HCNC,CPTII,S$GLB, | Performed by: INTERNAL MEDICINE

## 2024-06-14 PROCEDURE — 3008F BODY MASS INDEX DOCD: CPT | Mod: HCNC,CPTII,S$GLB, | Performed by: INTERNAL MEDICINE

## 2024-06-14 PROCEDURE — 99215 OFFICE O/P EST HI 40 MIN: CPT | Mod: HCNC,S$GLB,, | Performed by: INTERNAL MEDICINE

## 2024-06-14 PROCEDURE — 99999 PR PBB SHADOW E&M-EST. PATIENT-LVL III: CPT | Mod: PBBFAC,HCNC,, | Performed by: INTERNAL MEDICINE

## 2024-06-14 PROCEDURE — 1159F MED LIST DOCD IN RCRD: CPT | Mod: HCNC,CPTII,S$GLB, | Performed by: INTERNAL MEDICINE

## 2024-06-14 PROCEDURE — 1126F AMNT PAIN NOTED NONE PRSNT: CPT | Mod: HCNC,CPTII,S$GLB, | Performed by: INTERNAL MEDICINE

## 2024-06-14 PROCEDURE — 3288F FALL RISK ASSESSMENT DOCD: CPT | Mod: HCNC,CPTII,S$GLB, | Performed by: INTERNAL MEDICINE

## 2024-06-14 PROCEDURE — 1101F PT FALLS ASSESS-DOCD LE1/YR: CPT | Mod: HCNC,CPTII,S$GLB, | Performed by: INTERNAL MEDICINE

## 2024-06-14 PROCEDURE — 3074F SYST BP LT 130 MM HG: CPT | Mod: HCNC,CPTII,S$GLB, | Performed by: INTERNAL MEDICINE

## 2024-06-14 NOTE — PATIENT INSTRUCTIONS
Assessment/Plan:  Elijah Love is a 68 y.o. male with PAD s/p hospitalization for acute on chronic lower limb ischemia of his left leg (9/2/2022), HLD, smoking, overweight, who presents for a follow up appointment.     Hyperlipidemia-  LDL 59 on 6/7/24 vs 38 on 11/8/2023 vs 85 on 1/30/2023 vs 154 on 2/1/202. Continue repatha 140 mg subq every 14 days. Check updated lipid level       2. PAD- Mr. Love reports improvement in LLE claudication symptoms since hospital discharge on 9/3/2022.  He has no rest pain or tissue loss.  Continue medical management of PAD with cilostazol 100 mg bid and ASA 81 mg daily.  Continue walking program with goal of at least 30 minutes a day/5 days a week. ALESSANDRA on 9/19/2023 revealed 1.14 right, 0.78 on left. US Abdominal aorta on 4/19/2023 shows no abdominal aortic aneurysm     3. Smoking- Following smoking cessation. Reduced smoking compared to previous [ 5 cigarettes currently compared to 30 previously]    4. Overweight- Encourage diet, exercise, and weight loss.    Follow up in 6 months

## 2024-06-14 NOTE — PROGRESS NOTES
[facility]      Chief Complaint   Patient presents with    Other hyperlipidemia       Patient ID: Elijah Love is a 68 y.o. male with PAD s/p hospitalization for acute on chronic lower limb ischemia of his left leg (9/2/2022), HLD, smoking, overweight, who presents for a follow up appointment.  Pertinent history/events are as follows:     -Pt kindly referred by Dr. Granado for evaluation of hyperlipidemia.    -At our initial clinic visit on 9/28/2022, Mr. Love reported improvement in LLE claudication symptoms since hospital discharge.  He has no rest pain or tissue loss.  Smokes 1 pack a day for total of 50 years.  States he now participating in smoking cessation.  Labs from 2/1/2021 show total cholesterol 264 with  .  Mr. Love states he was unable to tolerate statins and fenofibrate due to issues with the size of the pills.    Plan:   Hyperlipidemia- Labs from 2/1/2021 show total cholesterol 264 with  .  Mr. Love states he was unable to tolerate statins and fenofibrate due to issues with the size of the pills.  Start bempedoic acid 180 mg daily.     PAD- Mr. Love reports improvement in LLE claudication symptoms since hospital discharge on 9/3/2022.  He has no rest pain or tissue loss.  Continue medical management of PAD with cilostazol 100 mg bid and ASA 81 mg daily.  Pt to start walking program with goal of at least 30 minutes a day/5 days a week.  Smoking- Encourage smoking cessation.  Overweight- Encourage diet, exercise, and weight loss.    - At clinic visit on  2/6/2023 Mr. Love reports no chest pain, SOB, LE edema, claudication, rest pain, or tissue loss.  LDL 85 on 1/30/2023 vs 154 on 2/1/2021.    Plan:  Hyperlipidemia-  LDL 85 on 1/30/2023 vs 154 on 2/1/2021.  Continue repatha 140 mg subq every 14 days.       PAD- Mr. Love reports improvement in LLE claudication symptoms since hospital discharge on 9/3/2022.  He has no rest pain or tissue loss.   Continue medical management of PAD with cilostazol 100 mg bid and ASA 81 mg daily.  Continue walking program with goal of at least 30 minutes a day/5 days a week.  Smoking- Encourage smoking cessation.  Overweight- Encourage diet, exercise, and weight loss.  Follow up in 6 months with lipids and cmp prior     - At clinic visit on 11/15/23 Mr. Love reports no chest pain, SOB, LE edema, claudication, rest pain, or tissue loss.  LDL 38 on 11/8/2023 vs 85 on 1/30/2023 vs 154 on 2/1/2021. Reduced smoking compared to previous [ 5 cigarettes currently compared to 30 previously]. He reports he has started walking regularly. ALESSANDRA on 9/19/2023 revealed 1.14 right, 0.78 on left. US Abdominal aorta on 4/19/2023 shows no abdominal aortic aneurysm   Plan:   Hyperlipidemia-  LDL 85 on 1/30/2023 vs 154 on 2/1/2021.  Continue repatha 140 mg subq every 14 days.       PAD- Mr. Love reports improvement in LLE claudication symptoms since hospital discharge on 9/3/2022.  He has no rest pain or tissue loss.  Continue medical management of PAD with cilostazol 100 mg bid and ASA 81 mg daily.  Continue walking program with goal of at least 30 minutes a day/5 days a week. ALESSANDRA on 9/19/2023 revealed 1.14 right, 0.78 on left. US Abdominal aorta on 4/19/2023 shows no abdominal aortic aneurysm   Smoking- Following smoking cessation. Reduced smoking compared to previous [ 5 cigarettes currently compared to 30 previously]  Overweight- Encourage diet, exercise, and weight loss.    HPI:  Mr. Love reports no chest pain, SOB, LE edema, claudication, rest pain, or tissue loss.  LDL 59 on 6/7/24 vs 38 on 11/8/2023 vs 85 on 1/30/2023 vs 154 on 2/1/2021. Reduced smoking compared to previous [ 5 cigarettes currently compared to 30 previously]  He reports he has started walking regularly. ALESSANDRA on 9/19/2023 revealed 1.14 right, 0.78 on left. US Abdominal aorta on 4/19/2023 shows no abdominal aortic aneurysm     Past Medical History:   Diagnosis Date     Kidney stones     Peripheral vascular disease, unspecified      No past surgical history on file.  Social History     Socioeconomic History    Marital status: Single   Tobacco Use    Smoking status: Every Day     Current packs/day: 0.50     Types: Cigarettes    Smokeless tobacco: Never    Tobacco comments:     Currently down to 8-10 cigarettes a day. Has Nicotine Lozenges as needed.   Substance and Sexual Activity    Alcohol use: Yes     Comment: socially    Drug use: Never    Sexual activity: Yes     Social Determinants of Health     Financial Resource Strain: Medium Risk (1/3/2024)    Overall Financial Resource Strain (CARDIA)     Difficulty of Paying Living Expenses: Somewhat hard   Food Insecurity: Food Insecurity Present (1/3/2024)    Hunger Vital Sign     Worried About Running Out of Food in the Last Year: Sometimes true     Ran Out of Food in the Last Year: Sometimes true   Transportation Needs: No Transportation Needs (1/3/2024)    PRAPARE - Transportation     Lack of Transportation (Medical): No     Lack of Transportation (Non-Medical): No   Physical Activity: Insufficiently Active (1/3/2024)    Exercise Vital Sign     Days of Exercise per Week: 3 days     Minutes of Exercise per Session: 10 min   Stress: No Stress Concern Present (1/3/2024)    [place] [employer] - Occupational Stress Questionnaire     Feeling of Stress : Not at all   Housing Stability: Low Risk  (1/3/2024)    Housing Stability Vital Sign     Unable to Pay for Housing in the Last Year: No     Number of Places Lived in the Last Year: 1     Unstable Housing in the Last Year: No     No family history on file.    Review of patient's allergies indicates:  No Known Allergies    Medication List with Changes/Refills   Current Medications    ASPIRIN 81 MG CHEW    Take 1 tablet (81 mg total) by mouth once daily.    CILOSTAZOL (PLETAL) 50 MG TAB    Take 1 tablet (50 mg total) by mouth 2 (two) times daily.    EVOLOCUMAB  "(REPATHA SYRINGE) 140 MG/ML SYRG    Inject 140 mg into the skin every 14 (fourteen) days.    NICOTINE POLACRILEX 4 MG LOZG    Take 1 lozenge (4 mg total) by mouth as needed (1-2 per hour in place of cigarettes).       Review of Systems  Constitution: Denies chills, fever, and sweats.  HENT: Denies headaches or blurry vision.  Cardiovascular: Denies chest pain or irregular heart beat.  Respiratory: Denies cough or shortness of breath.  Gastrointestinal: Denies abdominal pain, nausea, or vomiting.  Musculoskeletal: No muscle cramps, pain  Neurological: Denies dizziness or focal weakness.  Psychiatric/Behavioral: Normal mental status.  Hematologic/Lymphatic: Denies bleeding problem or easy bruising/bleeding.  Skin: Denies rash or suspicious lesions    Physical Examination  /64   Pulse 102   Ht 5' 5" (1.651 m)   Wt 72.1 kg (158 lb 15.2 oz)   BMI 26.45 kg/m²     Constitutional: No acute distress, conversant  HEENT: Sclera anicteric, Pupils equal, round and reactive to light, extraocular motions intact, Oropharynx clear  Neck: No JVD, no carotid bruits  Cardiovascular: regular rate and rhythm, no murmur, rubs or gallops, normal S1/S2  Pulmonary: Clear to auscultation bilaterally  Abdominal: Abdomen soft, nontender, nondistended, positive bowel sounds  Extremities: No lower extremity edema   Pulses:  Carotid pulses are 2+ on the right side, and 2+ on the left side.  Radial pulses are 2+ on the right side, and 2+ on the left side.   Femoral pulses are 2+ on the right side, and 2+ on the left side.  Popliteal pulses are 2+ on the right side, and 2+ on the left side.   Dorsalis pedis pulses are 2+ on the right side, and 0 on the left side.   Posterior tibial pulses are 2+ on the right side, and 0 on the left side.    Skin: No ecchymosis, erythema, or ulcers  Psych: Alert and oriented x 3, appropriate affect  Neuro: CNII-XII intact, no focal deficits    Labs:  Most Recent Data  CBC:   Lab Results   Component Value " "Date    WBC 9.30 03/21/2023    HGB 16.2 03/21/2023    HCT 49.3 03/21/2023     03/21/2023    MCV 91 03/21/2023    RDW 14.2 03/21/2023     BMP:   Lab Results   Component Value Date     03/21/2023    K 4.1 03/21/2023     03/21/2023    CO2 23 03/21/2023    BUN 19 03/21/2023    CREATININE 1.2 03/21/2023     (H) 03/21/2023    CALCIUM 10.1 03/21/2023     LFTS;   Lab Results   Component Value Date    PROT 8.0 03/21/2023    ALBUMIN 4.3 03/21/2023    BILITOT 0.5 03/21/2023    AST 18 03/21/2023    ALKPHOS 76 03/21/2023    ALT 12 03/21/2023     COAGS:   Lab Results   Component Value Date    INR 1.1 09/02/2022     FLP:   Lab Results   Component Value Date    CHOL 127 06/07/2024    HDL 46 06/07/2024    LDLCALC 58.8 (L) 06/07/2024    TRIG 111 06/07/2024    CHOLHDL 36.2 06/07/2024     CARDIAC: No results found for: "TROPONINI", "CKTOTAL", "CKMB", "BNP"    Imaging:    US Abdominal aorta 4/19/2023  No abdominal aortic aneurysm     LLE Arterial Ultrasound 9/2/2022:  Significant stenosis in the distal SFA, popliteal artery, and calf arteries with monophasic dampened waveform and sluggish velocities.  Findings are suggestive of advanced atherosclerosis or thromboembolic process.  Suggest correlation with physical exam and vascular surgery evaluation as appropriate.   Nonvisualization of the left dorsalis pedis artery.    Assessment/Plan:  Elijah Love is a 68 y.o. male with PAD s/p hospitalization for acute on chronic lower limb ischemia of his left leg (9/2/2022), HLD, smoking, overweight, who presents for a follow up appointment.     Hyperlipidemia-  LDL 59 on 6/7/24 vs 38 on 11/8/2023 vs 85 on 1/30/2023 vs 154 on 2/1/202. Continue repatha 140 mg subq every 14 days. Check updated lipid level       2. PAD- Mr. Love reports improvement in LLE claudication symptoms since hospital discharge on 9/3/2022.  He has no rest pain or tissue loss.  Continue medical management of PAD with cilostazol 100 mg bid and " ASA 81 mg daily.  Continue walking program with goal of at least 30 minutes a day/5 days a week. ALESSANDRA on 9/19/2023 revealed 1.14 right, 0.78 on left. US Abdominal aorta on 4/19/2023 shows no abdominal aortic aneurysm     3. Smoking- Following smoking cessation. Reduced smoking compared to previous [ 5 cigarettes currently compared to 30 previously]    4. Overweight- Encourage diet, exercise, and weight loss.    Follow up in 6 months    Total duration of face to face visit time 30 minutes.  Total time spent counseling greater than fifty percent of total visit time.  Counseling included discussion regarding imaging findings, diagnosis, possibilities, treatment options, risks and benefits.  The patient had many questions regarding the options and long-term effects.    Patient seen and discussed with Dr. Mckeon. Further recommendations per the attending addendum.    Juvenal Rocha MD  PGY IV - Vascular Medicine Fellow   [facility]

## 2024-10-10 ENCOUNTER — OFFICE VISIT (OUTPATIENT)
Dept: VASCULAR SURGERY | Facility: CLINIC | Age: 69
End: 2024-10-10
Attending: SURGERY
Payer: MEDICARE

## 2024-10-10 VITALS
BODY MASS INDEX: 25.34 KG/M2 | TEMPERATURE: 98 F | HEART RATE: 111 BPM | DIASTOLIC BLOOD PRESSURE: 65 MMHG | WEIGHT: 152.13 LBS | HEIGHT: 65 IN | SYSTOLIC BLOOD PRESSURE: 115 MMHG

## 2024-10-10 DIAGNOSIS — I70.212 ATHEROSCLEROSIS OF NATIVE ARTERIES OF EXTREMITIES WITH INTERMITTENT CLAUDICATION, LEFT LEG: Primary | ICD-10-CM

## 2024-10-10 PROCEDURE — 3074F SYST BP LT 130 MM HG: CPT | Mod: HCNC,CPTII,S$GLB, | Performed by: SURGERY

## 2024-10-10 PROCEDURE — 99213 OFFICE O/P EST LOW 20 MIN: CPT | Mod: HCNC,S$GLB,, | Performed by: SURGERY

## 2024-10-10 PROCEDURE — 1125F AMNT PAIN NOTED PAIN PRSNT: CPT | Mod: HCNC,CPTII,S$GLB, | Performed by: SURGERY

## 2024-10-10 PROCEDURE — 99999 PR PBB SHADOW E&M-EST. PATIENT-LVL III: CPT | Mod: PBBFAC,HCNC,, | Performed by: SURGERY

## 2024-10-10 PROCEDURE — 1101F PT FALLS ASSESS-DOCD LE1/YR: CPT | Mod: HCNC,CPTII,S$GLB, | Performed by: SURGERY

## 2024-10-10 PROCEDURE — 3008F BODY MASS INDEX DOCD: CPT | Mod: HCNC,CPTII,S$GLB, | Performed by: SURGERY

## 2024-10-10 PROCEDURE — 3288F FALL RISK ASSESSMENT DOCD: CPT | Mod: HCNC,CPTII,S$GLB, | Performed by: SURGERY

## 2024-10-10 PROCEDURE — 1159F MED LIST DOCD IN RCRD: CPT | Mod: HCNC,CPTII,S$GLB, | Performed by: SURGERY

## 2024-10-10 PROCEDURE — 3078F DIAST BP <80 MM HG: CPT | Mod: HCNC,CPTII,S$GLB, | Performed by: SURGERY

## 2024-10-10 RX ORDER — CILOSTAZOL 50 MG/1
50 TABLET ORAL 2 TIMES DAILY
Qty: 60 TABLET | Refills: 11 | Status: SHIPPED | OUTPATIENT
Start: 2024-10-10 | End: 2025-10-10

## 2024-10-10 NOTE — PROGRESS NOTES
VASCULAR SURGERY NOTE    Patient ID: Elijah Love is a 69 y.o. male.    I. HISTORY     Chief Complaint: LLE claudication    HPI 10/10/24:  Patient reports he's doing better smoking only 2 cigs per day. 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 He walks around [place] and gets plenty of walking exercise. He can walk 6-8 blocks before he has to stop and then the pain quickly goes away. He does not smoke at work or smoke in the car. He only smokes at home after work. He still lives in [place].     HPI 3/21/24:  Patient reports great improvements to bilateral lower extremity claudication pain.  Patient says mild pain still remains but is more tolerable.  He is now able to move his grass.  He says [place] [facility] [place] to vascular surgery Clinic is no longer painful and exhausting.  He reports going to smoking cessation classes and is smoking about 8 cigarettes a day.  He was wrestling a laid off and has more free time to walk.  He walks everyday.  He is drinking more water.  Patient overall feeling well.  Denies fevers, chills, nausea, or vomiting.  Denies shortness of breath or chest pain.  Denies any new complaints.    HPI 9/18/23: He has been doing well since his last appointment.   6 blocks pain free walking distance. His claudication does not limit his day to day activities. He has been drinking water all day every day (even though he hates water) and only has iced tea every now and then. He is on repatha for his cholesterol and his LDL has gone down significantly. He is able to mow the lawn without stopping.     HPI 3/20/23:  Elijah Love is a 69 y.o. male who is here today for follow up appointment  for lower extremity claudication. States that his left lower extremity pain with walking is significantly improved and does not limit him in any way now.  Reports he was previously unable to ambulate half a city block but is now able to cut his grass and ambulate unassisted at  the grocery store without having to rest due to pain. He thinks he can walk about 4 blocks before he has to stop due to pain in his left calf. He is walking regularly to try and improve his walking distance. He is currently smoking around 6 cigarettes daily after total smoking cessation for a few weeks.  He is actively participating in cessation efforts with nicotine patches and lozenges and indicates he is very motivated to quit.      HPI 10/24/22: Today he returns and states that his claudication symptoms have continued to improve.  He was able to walk through [employer] and go shopping without stopping the other day.  He no longer has to take a scooter around the store.  He has been working hard to walk regularly and hydrate with at least 2 L of water per day.  He has also continued to take his cilostazol twice daily.  He has been able to cut down his smoking to 10 cigarettes per day which is significantly improved from 1.5ppd.  He continues with the [facility] smoking cessation program and has attended the classes regularly.  His goal is to quit smoking and he is wearing a patch to help him quit.  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 He realizes that he will have to change his behavior in order to cut out the remaining cigarettes and he will continue to work on this.  He was also able to see Dr. Mckeon and was prescribed Praluent to manage his hyperlipidemia since he is unable to swallow the statin tablets.  Thankfully he has been able to swallow the small cilostazol tablets.    HPI 9/16/22:  ...recent hospitalization on 9/3/22 for acute on chronic lower limb ischemia of his left leg. Prior to presentation at that time, he complained of claudication symptoms and was without any history of rest pain. He was treated with heparin gtt and fluid resuscitation in which he had improvement of symptoms. He is currently taking a chewable baby ASA daily. Due to inability to tolerate swallowing  pills, he was not started on a statin.     Patient states he has been doing better since he was discharged. He complains of leg pain after walking about a block or so. States he started having calf and shin pain after walking from the garage to the hospital entrance. The pain resolves with rest. The quality of the pain is crampy. The pain starts on the calf first. Since discharge, patient reports he has kept his hydration up and only drinks water instead of tea now. He also has cut back smoking from 1.5 pack/day to only about 5 cigarettes/day!!! I applauded him on his efforts and encouraged him to continue decreasing down to quitting.  States he is motivated to fully quit but is finding it hard to get through these last few cigarettes to completely quit.    Past Medical History:   Diagnosis Date    Kidney stones     Peripheral vascular disease, unspecified       Surgical History:  Tonsil/adenoidectomy    Social History     Occupational History    Not on file   Tobacco Use    Smoking status: Every Day     Current packs/day: 0.50     Types: Cigarettes    Smokeless tobacco: Never    Tobacco comments:     Currently down to 8-10 cigarettes a day. Has Nicotine Lozenges as needed.   Substance and Sexual Activity    Alcohol use: Yes     Comment: socially    Drug use: Never    Sexual activity: Yes     Review of Systems   Constitutional: Negative for weight loss.   HENT:  Negative for ear pain and nosebleeds.    Eyes:  Negative for discharge and pain.   Cardiovascular:  Negative for chest pain and palpitations.   Respiratory:  Negative for cough, shortness of breath and wheezing.    Endocrine: Negative for cold intolerance, heat intolerance and polyphagia.   Hematologic/Lymphatic: Negative for adenopathy. Does not bruise/bleed easily.   Skin:  Negative for itching and rash.   Musculoskeletal:  Negative for joint swelling and muscle cramps.   Gastrointestinal:  Negative for abdominal pain, diarrhea, nausea and vomiting.    Genitourinary:  Negative for dysuria and flank pain.   Neurological:  Negative for numbness and seizures.     II. PHYSICAL EXAM     Physical Exam  Constitutional:       General: He is not in acute distress.     Appearance: Normal appearance. He is not ill-appearing or diaphoretic.   HENT:      Head: Normocephalic and atraumatic.   Eyes:      General: No scleral icterus.        Right eye: No discharge.         Left eye: No discharge.      Extraocular Movements: Extraocular movements intact.      Conjunctiva/sclera: Conjunctivae normal.   Cardiovascular:      Rate and Rhythm: Normal rate and regular rhythm.      Comments: RLE: 2+ popliteal pulse  LLE: absent popliteal pulse  Pulmonary:      Effort: Pulmonary effort is normal. No respiratory distress.   Musculoskeletal:         General: Normal range of motion.      Cervical back: Normal range of motion and neck supple.      Right lower leg: No edema.      Left lower leg: No edema.   Skin:     General: Skin is warm and dry.      Capillary Refill: Capillary refill takes 2 to 3 seconds.      Coloration: Skin is not jaundiced or pale.      Findings: No erythema or rash.   Neurological:      General: No focal deficit present.      Mental Status: He is alert and oriented to person, place, and time. Mental status is at baseline.      Cranial Nerves: No cranial nerve deficit.   Psychiatric:         Mood and Affect: Mood normal.         Behavior: Behavior normal.       III. ASSESSMENT & PLAN (MEDICAL DECISION MAKING)     1. Atherosclerosis of native arteries of extremities with intermittent claudication, left leg        Imaging Results: (I have personally reviewed the images/studies and provided my interpretation below)    U/S 9/2: significant stenosis in distal SFA, popliteal artery and PT/AT.    ALESSANDRA 10/24/22:  RLE: 1.13  LLE: 0.58  Findings suggest no evidence of PAD on the right.  There is moderate to severe PAD on the left.    ALESSANDRA 9/18/23:  RLE: 1.14  LLE:  0.78    Assessment/Diagnosis and Plan:  69 y.o. male with LLE claudication.  Symptoms improving. Explained importance of smoking abstinence to improve durability of any surgical treatment if necessary in the future as well as to reduce risk of worsening PAD, cerebrovascular disease, coronary disease, COPD etc. Patient expressed understanding and was in agreement with the treatment plan.    -Continue Cilostazol 50mg BID   -Walking program at least 4x/week for 30min per session. Instructions provided  -Continue chewable ASA 81 mg daily. Recommended for all patients with PAD  -Continue to work on cutting smoking down until fully quit  -Continue repatha for high cholesterol  -Control of atherosclerotic risk factors: Goal LDL <100, Goal HbA1C <7, Goal BP <140/90  -Follow up in 6m for re-eval of symptoms, no studies    Guerita Barnhart, DO  PGY-1

## 2025-01-13 DIAGNOSIS — Z00.00 ENCOUNTER FOR MEDICARE ANNUAL WELLNESS EXAM: ICD-10-CM

## 2025-01-29 ENCOUNTER — OFFICE VISIT (OUTPATIENT)
Dept: CARDIOLOGY | Facility: CLINIC | Age: 70
End: 2025-01-29
Payer: MEDICARE

## 2025-01-29 VITALS
BODY MASS INDEX: 26.26 KG/M2 | HEIGHT: 65 IN | DIASTOLIC BLOOD PRESSURE: 72 MMHG | HEART RATE: 84 BPM | SYSTOLIC BLOOD PRESSURE: 114 MMHG | WEIGHT: 157.63 LBS

## 2025-01-29 DIAGNOSIS — I73.9 PAD (PERIPHERAL ARTERY DISEASE): Primary | ICD-10-CM

## 2025-01-29 DIAGNOSIS — Z72.0 TOBACCO ABUSE: ICD-10-CM

## 2025-01-29 DIAGNOSIS — E78.49 OTHER HYPERLIPIDEMIA: ICD-10-CM

## 2025-01-29 DIAGNOSIS — M79.662 PAIN IN LEFT LOWER LEG: ICD-10-CM

## 2025-01-29 DIAGNOSIS — Z78.9 STATIN INTOLERANCE: ICD-10-CM

## 2025-01-29 PROCEDURE — 3288F FALL RISK ASSESSMENT DOCD: CPT | Mod: HCNC,CPTII,S$GLB, | Performed by: INTERNAL MEDICINE

## 2025-01-29 PROCEDURE — 99999 PR PBB SHADOW E&M-EST. PATIENT-LVL III: CPT | Mod: PBBFAC,HCNC,, | Performed by: INTERNAL MEDICINE

## 2025-01-29 PROCEDURE — 1159F MED LIST DOCD IN RCRD: CPT | Mod: HCNC,CPTII,S$GLB, | Performed by: INTERNAL MEDICINE

## 2025-01-29 PROCEDURE — 3074F SYST BP LT 130 MM HG: CPT | Mod: HCNC,CPTII,S$GLB, | Performed by: INTERNAL MEDICINE

## 2025-01-29 PROCEDURE — 1126F AMNT PAIN NOTED NONE PRSNT: CPT | Mod: HCNC,CPTII,S$GLB, | Performed by: INTERNAL MEDICINE

## 2025-01-29 PROCEDURE — 1101F PT FALLS ASSESS-DOCD LE1/YR: CPT | Mod: HCNC,CPTII,S$GLB, | Performed by: INTERNAL MEDICINE

## 2025-01-29 PROCEDURE — 3078F DIAST BP <80 MM HG: CPT | Mod: HCNC,CPTII,S$GLB, | Performed by: INTERNAL MEDICINE

## 2025-01-29 PROCEDURE — 99212 OFFICE O/P EST SF 10 MIN: CPT | Mod: HCNC,S$GLB,, | Performed by: INTERNAL MEDICINE

## 2025-01-29 PROCEDURE — 3008F BODY MASS INDEX DOCD: CPT | Mod: HCNC,CPTII,S$GLB, | Performed by: INTERNAL MEDICINE

## 2025-01-29 NOTE — PROGRESS NOTES
[facility]      Chief Complaint   Patient presents with    Peripheral Arterial Disease       Patient ID: Elijah Love is a 69 y.o. male with PAD s/p hospitalization for acute on chronic lower limb ischemia of his left leg (9/2/2022), HLD, smoking, overweight, who presents for a follow up appointment.  Pertinent history/events are as follows:     -Pt kindly referred by Dr. Granado for evaluation of hyperlipidemia.    -At our initial clinic visit on 9/28/2022, Mr. Love reported improvement in LLE claudication symptoms since hospital discharge.  He has no rest pain or tissue loss.  Smokes 1 pack a day for total of 50 years.  States he now participating in smoking cessation.  Labs from 2/1/2021 show total cholesterol 264 with  .  Mr. Love states he was unable to tolerate statins and fenofibrate due to issues with the size of the pills.    Plan:   Hyperlipidemia- Labs from 2/1/2021 show total cholesterol 264 with  .  Mr. Love states he was unable to tolerate statins and fenofibrate due to issues with the size of the pills.  Start bempedoic acid 180 mg daily.     PAD- Mr. Love reports improvement in LLE claudication symptoms since hospital discharge on 9/3/2022.  He has no rest pain or tissue loss.  Continue medical management of PAD with cilostazol 100 mg bid and ASA 81 mg daily.  Pt to start walking program with goal of at least 30 minutes a day/5 days a week.  Smoking- Encourage smoking cessation.  Overweight- Encourage diet, exercise, and weight loss.    - At clinic visit on  2/6/2023 Mr. Love reports no chest pain, SOB, LE edema, claudication, rest pain, or tissue loss.  LDL 85 on 1/30/2023 vs 154 on 2/1/2021.    Plan:  Hyperlipidemia-  LDL 85 on 1/30/2023 vs 154 on 2/1/2021.  Continue repatha 140 mg subq every 14 days.       PAD- Mr. Love reports improvement in LLE claudication symptoms since hospital discharge on 9/3/2022.  He has no rest pain or tissue  loss.  Continue medical management of PAD with cilostazol 100 mg bid and ASA 81 mg daily.  Continue walking program with goal of at least 30 minutes a day/5 days a week.  Smoking- Encourage smoking cessation.  Overweight- Encourage diet, exercise, and weight loss.  Follow up in 6 months with lipids and cmp prior     -At clinic visit on 11/15/23 Mr. Love reports no chest pain, SOB, LE edema, claudication, rest pain, or tissue loss.  LDL 38 on 11/8/2023 vs 85 on 1/30/2023 vs 154 on 2/1/2021. Reduced smoking compared to previous [ 5 cigarettes currently compared to 30 previously]. He reports he has started walking regularly. ALESSANDRA on 9/19/2023 revealed 1.14 right, 0.78 on left. US Abdominal aorta on 4/19/2023 shows no abdominal aortic aneurysm   Plan:   Hyperlipidemia-  LDL 85 on 1/30/2023 vs 154 on 2/1/2021.  Continue repatha 140 mg subq every 14 days.       PAD- Mr. Love reports improvement in LLE claudication symptoms since hospital discharge on 9/3/2022.  He has no rest pain or tissue loss.  Continue medical management of PAD with cilostazol 100 mg bid and ASA 81 mg daily.  Continue walking program with goal of at least 30 minutes a day/5 days a week. ALESSANDRA on 9/19/2023 revealed 1.14 right, 0.78 on left. US Abdominal aorta on 4/19/2023 shows no abdominal aortic aneurysm   Smoking- Following smoking cessation. Reduced smoking compared to previous [ 5 cigarettes currently compared to 30 previously]  Overweight- Encourage diet, exercise, and weight loss.    -At clinic visit on 6/14/2024, Mr. Love reports no chest pain, SOB, LE edema, claudication, rest pain, or tissue loss.  LDL 59 on 6/7/24 vs 38 on 11/8/2023 vs 85 on 1/30/2023 vs 154 on 2/1/2021. Reduced smoking compared to previous [ 5 cigarettes currently compared to 30 previously. He reports he has started walking regularly. ALESSANDRA on 9/19/2023 revealed 1.14 right, 0.78 on left. US Abdominal aorta on 4/19/2023 shows no abdominal aortic aneurysm     HPI:    Kate doing well with no chest pain, SOB, LE edema, claudication, rest pain, or tissue loss.  LDL 51 on 1/25/2025. Still smoking 6-8 cigarettes daily. States he's trying to walk more.     Past Medical History:   Diagnosis Date    Kidney stones     Peripheral vascular disease, unspecified      No past surgical history on file.  Social History     Socioeconomic History    Marital status: Single   Tobacco Use    Smoking status: Every Day     Current packs/day: 0.50     Types: Cigarettes    Smokeless tobacco: Never    Tobacco comments:     Currently down to 8-10 cigarettes a day. Has Nicotine Lozenges as needed.   Substance and Sexual Activity    Alcohol use: Yes     Comment: socially    Drug use: Never    Sexual activity: Yes     Social Drivers of Health     Financial Resource Strain: Medium Risk (1/3/2024)    Overall Financial Resource Strain (CARDIA)     Difficulty of Paying Living Expenses: Somewhat hard   Food Insecurity: Food Insecurity Present (1/3/2024)    Hunger Vital Sign     Worried About Running Out of Food in the Last Year: Sometimes true     Ran Out of Food in the Last Year: Sometimes true   Transportation Needs: No Transportation Needs (1/3/2024)    PRAPARE - Transportation     Lack of Transportation (Medical): No     Lack of Transportation (Non-Medical): No   Physical Activity: Insufficiently Active (1/3/2024)    Exercise Vital Sign     Days of Exercise per Week: 3 days     Minutes of Exercise per Session: 10 min   Stress: No Stress Concern Present (1/3/2024)    [place] [employer] - Occupational Stress Questionnaire     Feeling of Stress : Not at all   Housing Stability: Low Risk  (1/3/2024)    Housing Stability Vital Sign     Unable to Pay for Housing in the Last Year: No     Number of Places Lived in the Last Year: 1     Unstable Housing in the Last Year: No     No family history on file.    Review of patient's allergies indicates:  No Known Allergies    Medication List with  "Changes/Refills   Current Medications    ASPIRIN 81 MG CHEW    Take 1 tablet (81 mg total) by mouth once daily.    CILOSTAZOL (PLETAL) 50 MG TAB    Take 1 tablet (50 mg total) by mouth 2 (two) times daily.    EVOLOCUMAB (REPATHA SYRINGE) 140 MG/ML SYRG    Inject 140 mg into the skin every 14 (fourteen) days.    NICOTINE POLACRILEX 4 MG LOZG    Take 1 lozenge (4 mg total) by mouth as needed (1-2 per hour in place of cigarettes).       Review of Systems  Constitution: Denies chills, fever, and sweats.  HENT: Denies headaches or blurry vision.  Cardiovascular: Denies chest pain or irregular heart beat.  Respiratory: Denies cough or shortness of breath.  Gastrointestinal: Denies abdominal pain, nausea, or vomiting.  Musculoskeletal: No muscle cramps, pain  Neurological: Denies dizziness or focal weakness.  Psychiatric/Behavioral: Normal mental status.  Hematologic/Lymphatic: Denies bleeding problem or easy bruising/bleeding.  Skin: Denies rash or suspicious lesions    Physical Examination  /72   Pulse 84   Ht 5' 5" (1.651 m)   Wt 71.5 kg (157 lb 10.1 oz)   BMI 26.23 kg/m²     Constitutional: No acute distress, conversant  HEENT: Sclera anicteric, Pupils equal, round and reactive to light, extraocular motions intact, Oropharynx clear  Neck: No JVD, no carotid bruits  Cardiovascular: regular rate and rhythm, no murmur, rubs or gallops, normal S1/S2  Pulmonary: Clear to auscultation bilaterally  Abdominal: Abdomen soft, nontender, nondistended, positive bowel sounds  Extremities: No lower extremity edema   Pulses:  Carotid pulses are 2+ on the right side, and 2+ on the left side.  Radial pulses are 2+ on the right side, and 2+ on the left side.   Femoral pulses are 2+ on the right side, and 2+ on the left side.  Popliteal pulses are 2+ on the right side, and 2+ on the left side.   Dorsalis pedis pulses are 2+ on the right side, and 0 on the left side.   Posterior tibial pulses are 2+ on the right side, and 0 on " "the left side.    Skin: No ecchymosis, erythema, or ulcers  Psych: Alert and oriented x 3, appropriate affect  Neuro: CNII-XII intact, no focal deficits    Labs:  Most Recent Data  CBC:   Lab Results   Component Value Date    WBC 9.30 03/21/2023    HGB 16.2 03/21/2023    HCT 49.3 03/21/2023     03/21/2023    MCV 91 03/21/2023    RDW 14.2 03/21/2023     BMP:   Lab Results   Component Value Date     03/21/2023    K 4.1 03/21/2023     03/21/2023    CO2 23 03/21/2023    BUN 19 03/21/2023    CREATININE 1.2 03/21/2023     (H) 03/21/2023    CALCIUM 10.1 03/21/2023     LFTS;   Lab Results   Component Value Date    PROT 8.0 03/21/2023    ALBUMIN 4.3 03/21/2023    BILITOT 0.5 03/21/2023    AST 18 03/21/2023    ALKPHOS 76 03/21/2023    ALT 12 03/21/2023     COAGS:   Lab Results   Component Value Date    INR 1.1 09/02/2022     FLP:   Lab Results   Component Value Date    CHOL 128 01/25/2025    HDL 55 01/25/2025    LDLCALC 50.6 (L) 01/25/2025    TRIG 112 01/25/2025    CHOLHDL 43.0 01/25/2025     CARDIAC: No results found for: "TROPONINI", "CKTOTAL", "CKMB", "BNP"    Imaging:    US Abdominal aorta 4/19/2023  No abdominal aortic aneurysm     LLE Arterial Ultrasound 9/2/2022:  Significant stenosis in the distal SFA, popliteal artery, and calf arteries with monophasic dampened waveform and sluggish velocities.  Findings are suggestive of advanced atherosclerosis or thromboembolic process.  Suggest correlation with physical exam and vascular surgery evaluation as appropriate.   Nonvisualization of the left dorsalis pedis artery.    Assessment/Plan:  Elijah Love is a 69 y.o. male with PAD s/p hospitalization for acute on chronic lower limb ischemia of his left leg (9/2/2022), HLD, smoking, overweight, who presents for a follow up appointment.     Hyperlipidemia-  LDL 51 on 1/25/2025. Continue repatha 140 mg subq every 14 days.       2. PAD- Mr. Love reports claudication symptoms are stable and not " lifestyle limiting. He has no rest pain or tissue loss.  Continue medical management of PAD with cilostazol 100 mg bid and ASA 81 mg daily.  Continue walking program with goal of at least 30 minutes a day/5 days a week. ALESSANDRA on 9/19/2023 revealed 1.14 right, 0.78 on left.     3. Smoking- Encourage smoking cessation.     4. Overweight- Encourage diet, exercise, and weight loss.    Follow up in 6 months    Total duration of face to face visit time 15 minutes.  Total time spent counseling greater than fifty percent of total visit time.  Counseling included discussion regarding imaging findings, diagnosis, possibilities, treatment options, risks and benefits.  The patient had many questions regarding the options and long-term effects.    Dennis Mckeon MD, PhD  Interventional Cardiology

## 2025-01-29 NOTE — PATIENT INSTRUCTIONS
Assessment/Plan:  Elijah Love is a 69 y.o. male with PAD s/p hospitalization for acute on chronic lower limb ischemia of his left leg (9/2/2022), HLD, smoking, overweight, who presents for a follow up appointment.     Hyperlipidemia-  LDL 51 on 1/25/2025. Continue repatha 140 mg subq every 14 days.       2. PAD- Mr. Love reports claudication symptoms are stable and not lifestyle limiting. He has no rest pain or tissue loss.  Continue medical management of PAD with cilostazol 100 mg bid and ASA 81 mg daily.  Continue walking program with goal of at least 30 minutes a day/5 days a week. ALESSANDRA on 9/19/2023 revealed 1.14 right, 0.78 on left.     3. Smoking- Encourage smoking cessation.     4. Overweight- Encourage diet, exercise, and weight loss.    Follow up in 6 months

## 2025-02-27 ENCOUNTER — PATIENT MESSAGE (OUTPATIENT)
Dept: ADMINISTRATIVE | Facility: CLINIC | Age: 70
End: 2025-02-27
Payer: MEDICARE

## 2025-03-04 ENCOUNTER — TELEPHONE (OUTPATIENT)
Dept: ADMINISTRATIVE | Facility: CLINIC | Age: 70
End: 2025-03-04
Payer: MEDICARE

## 2025-03-04 NOTE — TELEPHONE ENCOUNTER
Called pt; informed pt I was calling to confirm his virtual EAWV on 3/6/25 at 2:00pm and to see if he needed any help; pt stated he did not need any help and would complete e-pre check later today; pt informed to login 10 minutes prior to appt time

## 2025-03-06 ENCOUNTER — OFFICE VISIT (OUTPATIENT)
Dept: FAMILY MEDICINE | Facility: CLINIC | Age: 70
End: 2025-03-06
Payer: MEDICARE

## 2025-03-06 DIAGNOSIS — E78.49 OTHER HYPERLIPIDEMIA: ICD-10-CM

## 2025-03-06 DIAGNOSIS — I99.8 LOWER LIMB ISCHEMIA: ICD-10-CM

## 2025-03-06 DIAGNOSIS — I73.9 PAD (PERIPHERAL ARTERY DISEASE): ICD-10-CM

## 2025-03-06 DIAGNOSIS — Z00.00 ENCOUNTER FOR MEDICARE ANNUAL WELLNESS EXAM: Primary | ICD-10-CM

## 2025-03-06 NOTE — PATIENT INSTRUCTIONS
Counseling and Referral of Other Preventative  (Italic type indicates deductible and co-insurance are waived)    Patient Name: Elijah Love  Today's Date: 3/6/2025    Health Maintenance       Date Due Completion Date    Aspirin/Antiplatelet Therapy Never done ---    Colorectal Cancer Screening Never done ---    Shingles Vaccine (1 of 2) Never done ---    RSV Vaccine (Age 60+ and Pregnant patients) (1 - Risk 60-74 years 1-dose series) Never done ---    Pneumococcal Vaccines (Age 50+) (2 of 2 - PCV) 02/01/2022 2/1/2021    Influenza Vaccine (1) 09/01/2024 1/4/2022    Override on 10/14/2020: Done    Override on 11/4/2019: Done    COVID-19 Vaccine (1 - 2024-25 season) Never done ---    Hemoglobin A1c (Diabetic Prevention Screening) 03/21/2026 3/21/2023    Lipid Panel 01/25/2030 1/25/2025    TETANUS VACCINE 10/20/2030 10/20/2020        No orders of the defined types were placed in this encounter.      The following information is provided to all patients.  This information is to help you find resources for any of the problems found today that may be affecting your health:                  Living healthy guide: www.[facility].[place].gov      Understanding Diabetes: www.diabetes.org      Eating healthy: www.cdc.gov/healthyweight      CDC home safety checklist: www.cdc.gov/steadi/patient.html      Agency on Aging: www.goea.[place].[facility]      Alcoholics anonymous (AA): www.aa.org      Physical Activity: www.[place].nih.gov/ji2urqi      Tobacco use: www.quitwithusla.org         Counseling and Referral of Other Preventative  (Italic type indicates deductible and co-insurance are waived)    Patient Name: Elijah Love  Today's Date: 3/6/2025    Health Maintenance       Date Due Completion Date    Aspirin/Antiplatelet Therapy Never done ---    Colorectal Cancer Screening Never done ---    Shingles Vaccine (1 of 2) Never done ---    RSV Vaccine (Age 60+ and Pregnant patients) (1 - Risk 60-74 years 1-dose series) Never done ---     Pneumococcal Vaccines (Age 50+) (2 of 2 - PCV) 02/01/2022 2/1/2021    Influenza Vaccine (1) 09/01/2024 1/4/2022    Override on 10/14/2020: Done    Override on 11/4/2019: Done    COVID-19 Vaccine (1 - 2024-25 season) Never done ---    Hemoglobin A1c (Diabetic Prevention Screening) 03/21/2026 3/21/2023    Lipid Panel 01/25/2030 1/25/2025    TETANUS VACCINE 10/20/2030 10/20/2020        No orders of the defined types were placed in this encounter.      The following information is provided to all patients.  This information is to help you find resources for any of the problems found today that may be affecting your health:                  Living healthy guide: www.[facility].[place].gov      Understanding Diabetes: www.diabetes.org      Eating healthy: www.cdc.gov/healthyweight      [facility] home safety checklist: www.cdc.gov/steadi/patient.html      Agency on Aging: www.goea.[place].[facility]      Alcoholics anonymous (AA): www.aa.org      Physical Activity: www.[place].nih.gov/ee5hdux      Tobacco use: www.quitwithusla.org

## 2025-03-06 NOTE — PROGRESS NOTES
The patient location is: [place]  The chief complaint leading to consultation is: Medicare Wellness Visit       Visit type: audiovisual     Face to Face time with patient: 33 min  60 minutes of total time spent on the encounter, which includes face to face time and non-face to face time preparing to see the patient (eg, review of tests), Obtaining and/or reviewing separately obtained history, Documenting clinical information in the electronic or other health record, Independently interpreting results (not separately reported) and communicating results to the patient/family/caregiver, or Care coordination (not separately reported).            Each patient to whom he or she provides medical services by telemedicine is:  (1) informed of the relationship between the physician and patient and the respective role of any other health care provider with respect to management of the patient; and (2) notified that he or she may decline to receive medical services by telemedicine and may withdraw from such care at any time.      Elijah Love presented for a  Medicare AWV and comprehensive Health Risk Assessment today. The following components were reviewed and updated:    Medical history  Family History  Social history  Allergies and Current Medications  Health Risk Assessment  Health Maintenance  Care Team         ** See Completed Assessments for Annual Wellness Visit within the encounter summary.**         The following assessments were completed:  Living Situation  CAGE  Depression Screening  Timed Get Up and Go  Whisper Test  Cognitive Function Screening  Nutrition Screening  ADL Screening  PAQ Screening      Opioid documentation for eAWV      Patient does not have a current opioid prescription.            Review for Substance Use Disorders: Patient does not use substance      Current Medications[1]         There were no vitals filed for this visit.     Review of Systems   Constitutional:  Negative for chills,  diaphoresis and fever.   HENT:  Negative for congestion and ear pain.    Eyes:  Negative for blurred vision.   Respiratory:  Negative for cough.    Cardiovascular:  Negative for chest pain.   Gastrointestinal:  Negative for nausea and vomiting.   Musculoskeletal:  Negative for falls.   Neurological:  Negative for dizziness, weakness and headaches.        Physical Exam  Gen- NAD, appears stated age  Resp- Breathing comfortably on RA  Neuro- Alert, spontaneous movements  Psych- Calm, cooperative, logical thought process  Physical exam limited by audiovisual encounter.          Diagnoses and health risks identified today and associated recommendations/orders:    1. Encounter for Medicare annual wellness exam  - Chart reviewed. Problem list updated. Discussed current medical diagnosis, current medications, medical/surgical/family/social history; updated provider list; documented vital signs; identified any cognitive impairment; and updated risk factor list. Addressed any outstanding health maintenance. Provided patient with personalized health advice. Continue to follow up with PCP and any specialists.    - Ambulatory Referral/Consult to Enhanced Annual Wellness Visit (eAWV)  2. Lower limb ischemia  Chronic Pt taking aspirin and Repatha. Pt will follow up with PCP  3. Other hyperlipidemia  Chronic Pt taking aspirin and Repatha,  Pt will follow up with PCP  4. PAD (peripheral artery disease)  Chronic Pt taking aspirin and Repatha, Pt stills smoke and has cut back on amount of cigarettes he smoke a day.  Pt will follow up with PCP      Provided Elijah with a 5-10 year written screening schedule and personal prevention plan. Recommendations were developed using the USPSTF age appropriate recommendations. Education, counseling, and referrals were provided as needed. After Visit Summary printed and given to patient which includes a list of additional screenings\tests needed.    Follow up in about 1 year (around 3/6/2026)  for Your next medicare wellness visit.    CHELO Ramos    Advance Care Planning     I offered to discuss advanced care planning, including how to pick a person who would make decisions for you if you were unable to make them for yourself, called a health care power of , and what kind of decisions you might make such as use of life sustaining treatments such as ventilators and tube feeding when faced with a life limiting illness recorded on a living will that they will need to know. (How you want to be cared for as you near the end of your natural life)     X Patient is interested in learning more about how to make advanced directives.  I discussed how to get paperwork and offered to discuss this with them.       [1]   Current Outpatient Medications:     cilostazoL (PLETAL) 50 MG Tab, Take 1 tablet (50 mg total) by mouth 2 (two) times daily., Disp: 60 tablet, Rfl: 11    evolocumab (REPATHA SYRINGE) 140 mg/mL Syrg, Inject 140 mg into the skin every 14 (fourteen) days., Disp: 10 mL, Rfl: 10    nicotine polacrilex 4 MG Lozg, Take 1 lozenge (4 mg total) by mouth as needed (1-2 per hour in place of cigarettes)., Disp: 72 lozenge, Rfl: 0    aspirin 81 MG Chew, Take 1 tablet (81 mg total) by mouth once daily., Disp: 360 tablet, Rfl: 0

## 2025-03-10 ENCOUNTER — PATIENT MESSAGE (OUTPATIENT)
Dept: FAMILY MEDICINE | Facility: CLINIC | Age: 70
End: 2025-03-10
Payer: MEDICARE

## 2025-03-13 ENCOUNTER — PATIENT MESSAGE (OUTPATIENT)
Dept: FAMILY MEDICINE | Facility: CLINIC | Age: 70
End: 2025-03-13
Payer: MEDICARE

## 2025-04-14 ENCOUNTER — OFFICE VISIT (OUTPATIENT)
Dept: PRIMARY CARE CLINIC | Facility: CLINIC | Age: 70
End: 2025-04-14
Payer: MEDICARE

## 2025-04-14 VITALS
DIASTOLIC BLOOD PRESSURE: 62 MMHG | OXYGEN SATURATION: 97 % | HEIGHT: 65 IN | WEIGHT: 153.13 LBS | BODY MASS INDEX: 25.51 KG/M2 | SYSTOLIC BLOOD PRESSURE: 124 MMHG | RESPIRATION RATE: 16 BRPM | HEART RATE: 93 BPM

## 2025-04-14 DIAGNOSIS — I73.9 PAD (PERIPHERAL ARTERY DISEASE): ICD-10-CM

## 2025-04-14 DIAGNOSIS — K02.9 DENTAL CARIES: ICD-10-CM

## 2025-04-14 DIAGNOSIS — Z72.0 TOBACCO ABUSE: ICD-10-CM

## 2025-04-14 DIAGNOSIS — E78.49 OTHER HYPERLIPIDEMIA: ICD-10-CM

## 2025-04-14 DIAGNOSIS — Z78.9 STATIN INTOLERANCE: ICD-10-CM

## 2025-04-14 DIAGNOSIS — Z87.442 HISTORY OF NEPHROLITHIASIS: ICD-10-CM

## 2025-04-14 DIAGNOSIS — M79.662 PAIN IN LEFT LOWER LEG: Primary | ICD-10-CM

## 2025-04-14 DIAGNOSIS — R20.0 NUMBNESS OF LEFT FOOT: ICD-10-CM

## 2025-04-14 PROCEDURE — 99999 PR PBB SHADOW E&M-EST. PATIENT-LVL III: CPT | Mod: PBBFAC,HCNC,, | Performed by: FAMILY MEDICINE

## 2025-04-14 NOTE — PROGRESS NOTES
Subjective:       Patient ID: Elijah Love is a 69 y.o. male.    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 --eating well--+BM--ambulating well no fall  Hyperlipidemia on Repatha--saw cardiologist--cholesterol and triglyceride were doing well  Peripheral artery disease lower limb ischemia just the left lower leg on Pletal baby aspirin  History diverticulitis January 2023  Tobacco abuse--smoking half pack per day  History nephrolithiasis x2 in the 1980s      ROS:  Skin: no psoriasis, eczema, skin cancer  HEENT: No headache, ocular pain, blurred vision, diplopia, epistaxis, hoarseness change in voice, thyroid trouble  Lung: No pneumonia, asthma, Tb, wheezing, SOB, +smoking 1/2  pack per day  Heart: No chest pain, ankle edema, palpitations, MI, micheal murmur, hypertension, +hyperlipidemia--difficulty swallowing pills so on repatha no stent bypass arrhythmia PVD --in the left leg--on pletal and baby asa   Abdomen: No nausea, vomiting, diarrhea, constipation, ulcers, hepatitis, gallbladder disease, melena, hematochezia, hematemesis history of acute diverticulitis Jan 2023   : no UTI, renal disease, +  stones x2 in 1980s-no prostate problems  MS: no fractures, O/A, lupus, rheumatoid, gout  Neuro: No dizziness, LOC, seizures   No diabetes, no anemia, no anxiety, no depression  Single, no children, work laid off , lives alone     Objective:   Physical Exam:  General: Well nourished, well developed, no acute distress  Skin: No lesions  HEENT: Eyes PERRLA, EOM intact, early bilateral cataracts  nose patent, throat non-erythematous ears TM clearDental caries  NECK: Supple, no bruits, No JVD, no nodes  Lungs: Clear, no rales, rhonchi, wheezing  Heart: Regular rate and rhythm, no murmurs, gallops, or rubs  Abdomen: flat, bowel sounds positive, no tenderness, or organomegaly  MS:  ROM and MS  intact   Neuro: Alert, CN intact, oriented X 3  Extremities:  No cyanosis, clubbing, or edema         Assessment:       1. Pain in left lower leg    2. PAD (peripheral artery disease)    3. Other hyperlipidemia    4. Numbness of left foot    5. History of nephrolithiasis    6. Tobacco abuse    7. Statin intolerance          Plan:       Pain in left lower leg    PAD (peripheral artery disease)    Other hyperlipidemia    Numbness of left foot    History of nephrolithiasis    Tobacco abuse    Statin intolerance          Left leg pain--PVD --is walking thru the pain --on pletal and baby asa   Hyperlipidemia on Repatha   Tobacco abuse--smoking 1/2 ppd   Dental caries especially upper jaws bilaterally needs to see dentist  History nephrolithiasis 20 years ago no problems now  Early bilateral cataracts   Physical exam CBCs CMP lipids T4 TSH

## 2025-07-07 ENCOUNTER — OFFICE VISIT (OUTPATIENT)
Dept: VASCULAR SURGERY | Facility: CLINIC | Age: 70
End: 2025-07-07
Attending: SURGERY
Payer: MEDICARE

## 2025-07-07 VITALS
BODY MASS INDEX: 26.08 KG/M2 | SYSTOLIC BLOOD PRESSURE: 126 MMHG | HEART RATE: 96 BPM | DIASTOLIC BLOOD PRESSURE: 81 MMHG | HEIGHT: 65 IN | TEMPERATURE: 98 F | WEIGHT: 156.5 LBS

## 2025-07-07 DIAGNOSIS — I70.212 ATHEROSCLEROSIS OF NATIVE ARTERIES OF EXTREMITIES WITH INTERMITTENT CLAUDICATION, LEFT LEG: Primary | ICD-10-CM

## 2025-07-07 PROCEDURE — 3079F DIAST BP 80-89 MM HG: CPT | Mod: CPTII,HCNC,S$GLB, | Performed by: SURGERY

## 2025-07-07 PROCEDURE — 99999 PR PBB SHADOW E&M-EST. PATIENT-LVL III: CPT | Mod: PBBFAC,HCNC,, | Performed by: SURGERY

## 2025-07-07 PROCEDURE — 1126F AMNT PAIN NOTED NONE PRSNT: CPT | Mod: CPTII,HCNC,S$GLB, | Performed by: SURGERY

## 2025-07-07 PROCEDURE — 3008F BODY MASS INDEX DOCD: CPT | Mod: CPTII,HCNC,S$GLB, | Performed by: SURGERY

## 2025-07-07 PROCEDURE — 1101F PT FALLS ASSESS-DOCD LE1/YR: CPT | Mod: CPTII,HCNC,S$GLB, | Performed by: SURGERY

## 2025-07-07 PROCEDURE — 99213 OFFICE O/P EST LOW 20 MIN: CPT | Mod: HCNC,S$GLB,, | Performed by: SURGERY

## 2025-07-07 PROCEDURE — 1159F MED LIST DOCD IN RCRD: CPT | Mod: CPTII,HCNC,S$GLB, | Performed by: SURGERY

## 2025-07-07 PROCEDURE — 3288F FALL RISK ASSESSMENT DOCD: CPT | Mod: CPTII,HCNC,S$GLB, | Performed by: SURGERY

## 2025-07-07 PROCEDURE — 3074F SYST BP LT 130 MM HG: CPT | Mod: CPTII,HCNC,S$GLB, | Performed by: SURGERY

## 2025-07-07 NOTE — PROGRESS NOTES
VASCULAR SURGERY NOTE    Patient ID: Elijah Love is a 69 y.o. male.    I. HISTORY     Chief Complaint: LLE claudication    HPI 7/9/25:  Patient is still smoking 2-3 cigarettes per day.  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  He walks around in the parking lot and the athletic fields to get his steps in and he can typically walk 8 blocks or so before he has to stop and then the pain quickly resolved.  He does not smoke in the car.  He still lives in the same house.  He denies any major changes in his medical or surgical history since he was last seen in clinic.  He has continued with the same home medications.  He has been drinking more water.    HPI 10/10/24:  Patient reports he's doing better smoking only 2 cigs per day. 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 He walks around [place] and gets plenty of walking exercise. He can walk 6-8 blocks before he has to stop and then the pain quickly goes away. He does not smoke at work or smoke in the car. He only smokes at home after work. He still lives in [place].     HPI 3/21/24:  Patient reports great improvements to bilateral lower extremity claudication pain.  Patient says mild pain still remains but is more tolerable.  He is now able to move his grass.  He says walking from the [facility] parking lot to vascular surgery Clinic is no longer painful and exhausting.  He reports going to smoking cessation classes and is smoking about 8 cigarettes a day.  He was wrestling a laid off and has more free time to walk.  He walks everyday.  He is drinking more water.  Patient overall feeling well.  Denies fevers, chills, nausea, or vomiting.  Denies shortness of breath or chest pain.  Denies any new complaints.    HPI 9/18/23: He has been doing well since his last appointment.   6 blocks pain free walking distance. His claudication does not limit his day to day activities. He has been drinking water all day every day (even  though he hates water) and only has iced tea every now and then. He is on repatha for his cholesterol and his LDL has gone down significantly. He is able to mow the lawn without stopping.     HPI 3/20/23:  Elijah Love is a 69 y.o. male who is here today for follow up appointment  for lower extremity claudication. States that his left lower extremity pain with walking is significantly improved and does not limit him in any way now.  Reports he was previously unable to ambulate half a city block but is now able to cut his grass and ambulate unassisted at the grocery store without having to rest due to pain. He thinks he can walk about 4 blocks before he has to stop due to pain in his left calf. He is walking regularly to try and improve his walking distance. He is currently smoking around 6 cigarettes daily after total smoking cessation for a few weeks.  He is actively participating in cessation efforts with nicotine patches and lozenges and indicates he is very motivated to quit.      HPI 10/24/22: Today he returns and states that his claudication symptoms have continued to improve.  He was able to walk through [place] and go shopping without stopping the other day.  He no longer has to take a scooter around the store.  He has been working hard to walk regularly and hydrate with at least 2 L of water per day.  He has also continued to take his cilostazol twice daily.  He has been able to cut down his smoking to 10 cigarettes per day which is significantly improved from 1.5ppd.  He continues with the [facility] smoking cessation program and has attended the classes regularly.  His goal is to quit smoking and he is wearing a patch to help him quit.  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 He realizes that he will have to change his behavior in order to cut out the remaining cigarettes and he will continue to work on this.  He was also able to see Dr. Mckeon and was prescribed Praluent to  manage his hyperlipidemia since he is unable to swallow the statin tablets.  Thankfully he has been able to swallow the small cilostazol tablets.    HPI 9/16/22:  ...recent hospitalization on 9/3/22 for acute on chronic lower limb ischemia of his left leg. Prior to presentation at that time, he complained of claudication symptoms and was without any history of rest pain. He was treated with heparin gtt and fluid resuscitation in which he had improvement of symptoms. He is currently taking a chewable baby ASA daily. Due to inability to tolerate swallowing pills, he was not started on a statin.     Patient states he has been doing better since he was discharged. He complains of leg pain after walking about a block or so. States he started having calf and shin pain after walking from the garage to the hospital entrance. The pain resolves with rest. The quality of the pain is crampy. The pain starts on the calf first. Since discharge, patient reports he has kept his hydration up and only drinks water instead of tea now. He also has cut back smoking from 1.5 pack/day to only about 5 cigarettes/day!!! I applauded him on his efforts and encouraged him to continue decreasing down to quitting.  States he is motivated to fully quit but is finding it hard to get through these last few cigarettes to completely quit.    Past Medical History:   Diagnosis Date    Kidney stones     Peripheral vascular disease, unspecified       Surgical History:  Tonsil/adenoidectomy    Social History     Occupational History    Not on file   Tobacco Use    Smoking status: Every Day     Current packs/day: 0.50     Types: Cigarettes     Passive exposure: Current    Smokeless tobacco: Never    Tobacco comments:     Currently down to 8-10 cigarettes a day. Has Nicotine Lozenges as needed.   Substance and Sexual Activity    Alcohol use: Yes     Comment: socially    Drug use: Never    Sexual activity: Yes     Review of Systems   Constitutional: Negative  for weight loss.   HENT:  Negative for ear pain and nosebleeds.    Eyes:  Negative for discharge and pain.   Cardiovascular:  Negative for chest pain and palpitations.   Respiratory:  Negative for cough, shortness of breath and wheezing.    Endocrine: Negative for cold intolerance, heat intolerance and polyphagia.   Hematologic/Lymphatic: Negative for adenopathy. Does not bruise/bleed easily.   Skin:  Negative for itching and rash.   Musculoskeletal:  Negative for joint swelling and muscle cramps.   Gastrointestinal:  Negative for abdominal pain, diarrhea, nausea and vomiting.   Genitourinary:  Negative for dysuria and flank pain.   Neurological:  Negative for numbness and seizures.     II. PHYSICAL EXAM     Physical Exam  Constitutional:       General: He is not in acute distress.     Appearance: Normal appearance. He is normal weight. He is not ill-appearing or diaphoretic.   HENT:      Head: Normocephalic and atraumatic.   Eyes:      General: No scleral icterus.        Right eye: No discharge.         Left eye: No discharge.      Extraocular Movements: Extraocular movements intact.      Conjunctiva/sclera: Conjunctivae normal.   Cardiovascular:      Rate and Rhythm: Normal rate and regular rhythm.      Comments: RLE: 2+ popliteal pulse  LLE: remarkably he has a weakly palpable popliteal pulse, absent pedal pulses  Pulmonary:      Effort: Pulmonary effort is normal. No respiratory distress.   Musculoskeletal:         General: Normal range of motion.      Cervical back: Normal range of motion and neck supple.      Right lower leg: No edema.      Left lower leg: No edema.   Skin:     General: Skin is warm and dry.      Capillary Refill: Capillary refill takes 2 to 3 seconds.      Coloration: Skin is not jaundiced or pale.      Findings: No erythema or rash.   Neurological:      General: No focal deficit present.      Mental Status: He is alert and oriented to person, place, and time. Mental status is at baseline.       Cranial Nerves: No cranial nerve deficit.   Psychiatric:         Mood and Affect: Mood normal.         Behavior: Behavior normal.       III. ASSESSMENT & PLAN (MEDICAL DECISION MAKING)     1. Atherosclerosis of native arteries of extremities with intermittent claudication, left leg          Imaging Results: (I have personally reviewed the images/studies and provided my interpretation below)  No new imaging    Assessment/Diagnosis and Plan:  69 y.o. male with LLE claudication.  Symptoms remain benign and not lifestyle limiting. Explained importance of smoking abstinence to improve durability of any surgical treatment if necessary in the future as well as to reduce risk of worsening PAD, cerebrovascular disease, coronary disease, COPD etc. Patient expressed understanding and was in agreement with the treatment plan.    -Continue Cilostazol 50mg BID   -Walking program at least 4x/week for 30min per session. Instructions provided  -Continue chewable ASA 81 mg daily. Recommended for all patients with PAD  -Continue to work on cutting smoking down until fully quit  -Continue repatha for high cholesterol  -Control of atherosclerotic risk factors: Goal LDL <100, Goal HbA1C <7, Goal BP <140/90  -Follow up in 6m for re-eval of symptoms, no studies    ALEXIS Granado II, MD, VI  Vascular Surgery  [facility]